# Patient Record
Sex: FEMALE | Race: WHITE | NOT HISPANIC OR LATINO | Employment: OTHER | URBAN - METROPOLITAN AREA
[De-identification: names, ages, dates, MRNs, and addresses within clinical notes are randomized per-mention and may not be internally consistent; named-entity substitution may affect disease eponyms.]

---

## 2017-04-18 ENCOUNTER — TELEPHONE (OUTPATIENT)
Dept: UROLOGY | Facility: CLINIC | Age: 62
End: 2017-04-18

## 2017-04-20 ENCOUNTER — TELEPHONE (OUTPATIENT)
Dept: UROLOGY | Facility: CLINIC | Age: 62
End: 2017-04-20

## 2017-04-24 ENCOUNTER — TELEPHONE (OUTPATIENT)
Dept: RADIOLOGY | Facility: HOSPITAL | Age: 62
End: 2017-04-24

## 2017-04-25 ENCOUNTER — HOSPITAL ENCOUNTER (OUTPATIENT)
Dept: RADIOLOGY | Facility: HOSPITAL | Age: 62
Discharge: HOME | End: 2017-04-25
Attending: UROLOGY
Payer: COMMERCIAL

## 2017-04-25 DIAGNOSIS — N20.0 CALCULUS OF KIDNEY: ICD-10-CM

## 2017-04-25 DIAGNOSIS — N28.1 RENAL CYST: ICD-10-CM

## 2017-04-25 PROCEDURE — 74000 XR ABDOMEN AP 1 VIEW: CPT | Mod: 26,,, | Performed by: RADIOLOGY

## 2017-04-25 PROCEDURE — 74000 XR ABDOMEN AP 1 VIEW: CPT | Mod: TC,PO

## 2017-04-25 PROCEDURE — 25500020 PHARM REV CODE 255: Mod: PO | Performed by: UROLOGY

## 2017-04-25 PROCEDURE — 74178 CT ABD&PLV WO CNTR FLWD CNTR: CPT | Mod: TC,PO

## 2017-04-25 PROCEDURE — 74178 CT ABD&PLV WO CNTR FLWD CNTR: CPT | Mod: 26,,, | Performed by: RADIOLOGY

## 2017-04-25 RX ADMIN — IOHEXOL 30 ML: 350 INJECTION, SOLUTION INTRAVENOUS at 11:04

## 2017-04-25 RX ADMIN — IOHEXOL 100 ML: 350 INJECTION, SOLUTION INTRAVENOUS at 12:04

## 2017-05-03 ENCOUNTER — OFFICE VISIT (OUTPATIENT)
Dept: UROLOGY | Facility: CLINIC | Age: 62
End: 2017-05-03
Payer: COMMERCIAL

## 2017-05-03 VITALS
WEIGHT: 141.31 LBS | DIASTOLIC BLOOD PRESSURE: 88 MMHG | SYSTOLIC BLOOD PRESSURE: 150 MMHG | BODY MASS INDEX: 23.52 KG/M2 | HEART RATE: 80 BPM

## 2017-05-03 DIAGNOSIS — N28.1 RENAL CYST: Primary | ICD-10-CM

## 2017-05-03 DIAGNOSIS — N20.0 RENAL STONE: ICD-10-CM

## 2017-05-03 LAB
BILIRUB SERPL-MCNC: NORMAL MG/DL
BLOOD URINE, POC: NORMAL
COLOR, POC UA: NORMAL
GLUCOSE UR QL STRIP: NORMAL
KETONES UR QL STRIP: NORMAL
LEUKOCYTE ESTERASE URINE, POC: NORMAL
NITRITE, POC UA: NORMAL
PH, POC UA: 8
PROTEIN, POC: NORMAL
SPECIFIC GRAVITY, POC UA: 1
UROBILINOGEN, POC UA: NORMAL

## 2017-05-03 PROCEDURE — 99214 OFFICE O/P EST MOD 30 MIN: CPT | Mod: 25,S$GLB,, | Performed by: UROLOGY

## 2017-05-03 PROCEDURE — 99999 PR PBB SHADOW E&M-EST. PATIENT-LVL II: CPT | Mod: PBBFAC,,, | Performed by: UROLOGY

## 2017-05-03 PROCEDURE — 1160F RVW MEDS BY RX/DR IN RCRD: CPT | Mod: S$GLB,,, | Performed by: UROLOGY

## 2017-05-03 PROCEDURE — 81002 URINALYSIS NONAUTO W/O SCOPE: CPT | Mod: S$GLB,,, | Performed by: UROLOGY

## 2017-05-03 NOTE — MR AVS SNAPSHOT
[facility]  [address] [facility] [place] [address]  Phone: [phone]  Fax: [phone]                  Jemma Pastrana   5/3/2017 2:00 PM   Office Visit    Description:  Female : 1955   Provider:  Murray Mao IV, MD   Department:  [facility]           Diagnoses this Visit        Comments    Renal cyst    -  Primary     Renal stone                To Do List           Future Appointments        Provider Department Dept Phone    5/3/2018 8:00 AM [facility] US3 [facility] [phone]    2018 9:40 AM Murray Mao IV, MD [facility] [phone]      Goals (5 Years of Data)     None      Follow-Up and Disposition     Return in about 1 year (around 5/3/2018).    Follow-up and Disposition History      [facility]     [facility] -  Assistance  Unless otherwise directed by your provider, please contact [facility], our nurse care line that is available for  assistance.     Registered nurses in the [facility] provide: appointment scheduling, clinical advisement, health education, and other advisory services.  Call: [phone] (toll free)               Medications           Message regarding Medications     Verify the changes and/or additions to your medication regime listed below are the same as discussed with your clinician today.  If any of these changes or additions are incorrect, please notify your healthcare provider.        STOP taking these medications     raloxifene (EVISTA) 60 mg tablet Take 1 tablet (60 mg total) by mouth once daily.           Verify that the below list of medications is an accurate representation of the medications you are currently taking.  If none reported, the list may be blank. If incorrect, please contact your healthcare provider. Carry this list with you in case of emergency.           Current Medications     amlodipine (NORVASC) 10 MG tablet Take 10 mg by mouth  once daily.    buPROPion (WELLBUTRIN XL) 300 MG 24 hr tablet Take 300 mg by mouth.    chlorthalidone (HYGROTEN) 25 MG Tab Take 12.5 mg by mouth once daily.    ramipril (ALTACE) 10 MG capsule Take 10 mg by mouth once daily.           Clinical Reference Information           Your Vitals Were     BP Pulse Weight BMI       150/88 (BP Location: Left arm, Patient Position: Sitting, BP Method: Automatic) 80 64.1 kg (141 lb 5 oz) 23.52 kg/m2       Blood Pressure          Most Recent Value    BP  (!)  150/88      Allergies as of 5/3/2017     Procardia [Nifedipine]      Immunizations Administered on Date of Encounter - 5/3/2017     None      Orders Placed During Today's Visit      Normal Orders This Visit    POCT urine dipstick without microscope     Future Labs/Procedures Expected by Expires    US Retroperitoneal Complete (Kidney and  5/3/2017 5/3/2018      [facility] Sign-Up     Activating your [facility] account is as easy as 1-2-3!     1) Visit [email or web address], select Sign Up Now, enter this activation code and your date of birth, then select Next.  Activation code not generated  Current Patient Portal Status: Account disabled      2) Create a username and password to use when you visit [facility] in the future and select a security question in case you lose your password and select Next.    3) Enter your e-mail address and click Sign Up!    Additional Information  If you have questions, please e-mail [email or web address] or call [phone] to talk to our [facility] staff. Remember, [facility] is NOT to be used for urgent needs. For medical emergencies, dial 911.         Language Assistance Services     ATTENTION: Language assistance services are available, free of charge. Please call [phone].      ATENCIÓN: Si habla español, tiene a young disposición servicios gratuitos de asistencia lingüística. Llame al [phone].     CHÚ Ý: N?u b?n nói Ti?ng Vi?t, có các d?ch v? h? tr? ngôn ng? mi?n phí dành cho b?n. G?i s?  [phone].         [facility] complies with applicable Federal civil rights laws and does not discriminate on the basis of race, color, national origin, age, disability, or sex.

## 2017-05-03 NOTE — PROGRESS NOTES
Chief Complaint: Renal Mass    HPI:   5/3/17: Ct shows bilateral small cysts (BosII hyperdense) and some tiny renal stones but no action required.  Discussed in detail.  10/20/16: 62 yo woman referred by Dr. Lopez after discussion about her urologic history.  A year ago she had urolithiasis with trial of passage followed by ESWL and clearance of stone.  One occasion similar before that, all on the left.  About a week ago she had severe left flank pain, went to see Dr. Pereira and a CT RSS was done, and this raised concern for left renal cysts and a US was done.  The US was inconclusive so yesterday she had a CT with contrast.  No abd/pelvic pain and no exac/rel factors.  No hematuria.  No urolithiasis.  No urinary bother.  No other  history.     Allergies:  Procardia [nifedipine]    Medications:  has a current medication list which includes the following prescription(s): amlodipine, bupropion, chlorthalidone, and ramipril.    Review of Systems:  General: No fever, chills, fatigability, or weight loss.  Skin: No rashes, itching, or changes in color or texture of skin.  Chest: Denies SHI, cyanosis, wheezing, cough, and sputum production.  Abdomen: Appetite fine. No weight loss. Denies diarrhea, abdominal pain, hematemesis, or blood in stool.  Musculoskeletal: No joint stiffness or swelling. Denies back pain.  : As above.  All other review of systems negative.    PMH:   has a past medical history of Depression; Hypertension; Osteoporosis; and Urolithiasis.    PSH:   has a past surgical history that includes Hysterectomy; Bladder suspension; Robotic assisted laparoscopic anterior colposacropexy (); Umbilical Hernia Closure;  section, low transverse; and Oophorectomy.    FamHx: family history includes Prostate cancer in her father. There is no history of Allergies.    SocHx:  reports that she quit smoking about 33 years ago. She does not have any smokeless tobacco history on file. She reports that she  drinks alcohol. She reports that she does not use illicit drugs.      Physical Exam:  Vitals:    05/03/17 1359   BP: (!) 150/88   Pulse: 80     General: A&Ox3, no apparent distress, no deformities  Neck: No masses, normal thyroid  Lungs: normal inspiration, no use of accessory muscles  Heart: normal pulse, no arrhythmias  Abdomen: Soft, NT, ND  Skin: The skin is warm and dry. No jaundice.  Ext: No c/c/e.  : deferred    Labs/Studies: Urinalysis performed in clinic, summary: UA normal    Impression/Plan:   1. Reassured about renal cysts; will follow with US in one year, CT the year after that.  2. Discussed stone prevention again

## 2017-06-12 ENCOUNTER — OFFICE VISIT (OUTPATIENT)
Dept: OTOLARYNGOLOGY | Facility: CLINIC | Age: 62
End: 2017-06-12
Payer: COMMERCIAL

## 2017-06-12 VITALS
RESPIRATION RATE: 16 BRPM | TEMPERATURE: 99 F | SYSTOLIC BLOOD PRESSURE: 166 MMHG | WEIGHT: 144.81 LBS | HEIGHT: 65 IN | BODY MASS INDEX: 24.12 KG/M2 | DIASTOLIC BLOOD PRESSURE: 84 MMHG | HEART RATE: 79 BPM

## 2017-06-12 DIAGNOSIS — R05.3 CHRONIC COUGH: ICD-10-CM

## 2017-06-12 DIAGNOSIS — J30.2 SEASONAL ALLERGIC RHINITIS, UNSPECIFIED ALLERGIC RHINITIS TRIGGER: Primary | ICD-10-CM

## 2017-06-12 DIAGNOSIS — I10 ESSENTIAL HYPERTENSION: ICD-10-CM

## 2017-06-12 PROCEDURE — 99999 PR PBB SHADOW E&M-EST. PATIENT-LVL III: CPT | Mod: PBBFAC,,, | Performed by: ORTHOPAEDIC SURGERY

## 2017-06-12 PROCEDURE — 99213 OFFICE O/P EST LOW 20 MIN: CPT | Mod: S$GLB,,, | Performed by: ORTHOPAEDIC SURGERY

## 2017-06-12 NOTE — PROGRESS NOTES
Subjective:       Patient ID: Jemma Pastrana is a 61 y.o. female.    Chief Complaint: Nasal Congestion    Patient is a very pleasant 61 year old female here to see me today for evaluation of a persistent cough.  She says that it has been an issue for the last several months, and has gotten worse.  She has had an increase in her allergy symptoms including sneezing and nasal congestion and drainage.  She denies any occular symptoms.  She now has a dog at home, and has had for two years.  She does find her allergies are worse when she is near the dog.  She has not yet tried any OTC antihistamines and is not currently using any nasal sprays.  She has not had any fevers.      Review of Systems   Constitutional: Negative for chills, fatigue, fever and unexpected weight change.   HENT: Positive for congestion and postnasal drip. Negative for dental problem, ear discharge, ear pain, facial swelling, hearing loss, nosebleeds, rhinorrhea, sinus pressure, sneezing, sore throat, tinnitus, trouble swallowing and voice change.    Eyes: Negative for redness, itching and visual disturbance.   Respiratory: Positive for cough. Negative for choking, shortness of breath and wheezing.    Cardiovascular: Negative for chest pain and palpitations.   Gastrointestinal: Negative for abdominal pain.        No reflux.   Musculoskeletal: Negative for gait problem.   Skin: Negative for rash.   Allergic/Immunologic: Positive for environmental allergies.   Neurological: Negative for dizziness, light-headedness and headaches.       Objective:      Physical Exam   Constitutional: She is oriented to person, place, and time. She appears well-developed and well-nourished. No distress.   HENT:   Head: Normocephalic and atraumatic.   Right Ear: Tympanic membrane, external ear and ear canal normal.   Left Ear: Tympanic membrane, external ear and ear canal normal.   Nose: Nose normal. No mucosal edema, rhinorrhea, nasal deformity or septal deviation. No  epistaxis. Right sinus exhibits no maxillary sinus tenderness and no frontal sinus tenderness. Left sinus exhibits no maxillary sinus tenderness and no frontal sinus tenderness.   Mouth/Throat: Uvula is midline, oropharynx is clear and moist and mucous membranes are normal. Mucous membranes are not pale and not dry. No dental caries. No oropharyngeal exudate or posterior oropharyngeal erythema.   Eyes: Conjunctivae, EOM and lids are normal. Pupils are equal, round, and reactive to light. Right eye exhibits no chemosis. Left eye exhibits no chemosis. Right conjunctiva is not injected. Left conjunctiva is not injected. No scleral icterus. Right eye exhibits normal extraocular motion and no nystagmus. Left eye exhibits normal extraocular motion and no nystagmus.   Neck: Trachea normal and phonation normal. No tracheal tenderness present. No tracheal deviation present. No thyroid mass and no thyromegaly present.   Cardiovascular: Intact distal pulses.    Pulmonary/Chest: Effort normal. No stridor. No respiratory distress.   Abdominal: She exhibits no distension.   Lymphadenopathy:        Head (right side): No submental, no submandibular, no preauricular, no posterior auricular and no occipital adenopathy present.        Head (left side): No submental, no submandibular, no preauricular, no posterior auricular and no occipital adenopathy present.     She has no cervical adenopathy.   Neurological: She is alert and oriented to person, place, and time. No cranial nerve deficit. She displays a negative Romberg sign. Gait normal.   Skin: Skin is warm and dry. No rash noted. No erythema.   Psychiatric: She has a normal mood and affect. Her behavior is normal.       Assessment:       1. Seasonal allergic rhinitis, unspecified allergic rhinitis trigger    2. Chronic cough    3. Essential hypertension        Plan:       1.  AR:   The patient was given a prescription for a steroid nasal spray, and we discussed in detail the proper  mechanism of use directing the spray away from the nasal septum.  In addition, we also discussed that it will take two to three weeks of daily use to achieve maximal effectiveness.  I would recommend Flonase sensimist due to her septal deviation.  Also, add an oral antihistamine daily for the next 4-6 weeks.  If she continues to have symptoms at that point, could consider a trial of Singulair.  2.  Cough:  Patient presents today for initial evaluation with me of chronic cough.  We discussed that cough is very complex symptom as it may arise from numerous sources and frequently represents a combination of contributing factors resulting in chronic cough.  We discussed that chronic postnasal drip or nasal drainage whether infectious or allergic in nature may result in chronic irritation of the larynx leading to irritation and cough.  We also discussed that primary pulmonary pathology such as COPD or asthma may also result in chronic cough.  Additionally, laryngopharyngeal reflux may be present in patients who have a paucity of traditional GERD symptoms as only 3-4 reflux events per day lead to laryngeal irritation, whereas it requires 30-40 reflux events per day before patients began having dyspepsia and heartburn.  Primary laryngeal pathology such as neoplasms, polyps, nodules and granulomas may lead to dysphonia and chronic cough.  When the difficulties in this area is that the presence of cough, due to other etiologies, may result in the formation of polyps nodules and granulomas.   In the absence of evidence of primary pulmonary pathology, significant allergy symptoms, post nasal drip or primary laryngeal pathology ( eg. polyps, nodules, malignancy), sensory neuropathic cough is the most likely diagnosis.  She is on an ACE-I, which can have cough as a sie effect.  3.  Hypertension:  She is on an ACE-I, if her cough persists will have a trial of being off of the ACE-I.

## 2017-09-05 ENCOUNTER — TELEPHONE (OUTPATIENT)
Dept: OTOLARYNGOLOGY | Facility: CLINIC | Age: 62
End: 2017-09-05

## 2017-09-05 DIAGNOSIS — R22.1 NECK MASS: Primary | ICD-10-CM

## 2018-01-19 ENCOUNTER — TELEPHONE (OUTPATIENT)
Dept: OTOLARYNGOLOGY | Facility: HOSPITAL | Age: 63
End: 2018-01-19

## 2018-01-19 ENCOUNTER — HOSPITAL ENCOUNTER (OUTPATIENT)
Dept: RADIOLOGY | Facility: HOSPITAL | Age: 63
Discharge: HOME | End: 2018-01-19
Attending: ORTHOPAEDIC SURGERY
Payer: COMMERCIAL

## 2018-01-19 DIAGNOSIS — S09.92XA INJURY OF NOSE, INITIAL ENCOUNTER: ICD-10-CM

## 2018-01-19 DIAGNOSIS — S09.92XA INJURY OF NOSE, INITIAL ENCOUNTER: Primary | ICD-10-CM

## 2018-01-19 PROCEDURE — 70150 X-RAY EXAM OF FACIAL BONES: CPT | Mod: 26,,, | Performed by: RADIOLOGY

## 2018-01-19 PROCEDURE — 70150 X-RAY EXAM OF FACIAL BONES: CPT | Mod: TC,FY,PO

## 2018-01-19 NOTE — TELEPHONE ENCOUNTER
Attempted to reach patient to schedule an X ray Dr. Lopez ordered and wanted patient to do today but patient didn't answer, left a message with a call back number.

## 2018-01-19 NOTE — TELEPHONE ENCOUNTER
Scheduled Xray today at [facility] location at 5:00 pm per patient request.

## 2018-04-10 ENCOUNTER — OFFICE VISIT (OUTPATIENT)
Dept: OTOLARYNGOLOGY | Facility: CLINIC | Age: 63
End: 2018-04-10
Payer: COMMERCIAL

## 2018-04-10 DIAGNOSIS — H93.11 RIGHT-SIDED TINNITUS: Primary | ICD-10-CM

## 2018-04-10 DIAGNOSIS — J30.89 CHRONIC NON-SEASONAL ALLERGIC RHINITIS, UNSPECIFIED TRIGGER: ICD-10-CM

## 2018-04-10 PROCEDURE — 99213 OFFICE O/P EST LOW 20 MIN: CPT | Mod: S$GLB,,, | Performed by: ORTHOPAEDIC SURGERY

## 2018-04-10 NOTE — PROGRESS NOTES
Subjective:       Patient ID: Jemma Pastrana is a 62 y.o. female.    Chief Complaint: No chief complaint on file.    Patient is a very pleasant 62 y.o. female here to see me today for evaluation of tinnitus.  She reports tinnitus that first started 1 week ago.  She describes the tinnitus as hissing and is nonpulsatile.  The tinnitus is present in the right ear.  She has not noted any diminished hearing and discharge.  She had pain in the right ear only when she laid down last night, now resolved.  She has not recently started any new medications and has not had any dietary changes.  She has had increased allergy symptoms recently, has not started on any antihistamines or nasal sprays at this point.        Review of Systems   Constitutional: Negative for chills, fatigue, fever and unexpected weight change.   HENT: Positive for ear pain and tinnitus. Negative for congestion, dental problem, ear discharge, facial swelling, hearing loss, nosebleeds, postnasal drip, rhinorrhea, sinus pressure, sneezing, sore throat, trouble swallowing and voice change.    Eyes: Negative for redness, itching and visual disturbance.   Respiratory: Negative for cough, choking, shortness of breath and wheezing.    Cardiovascular: Negative for chest pain and palpitations.   Gastrointestinal: Negative for abdominal pain.        No reflux.   Musculoskeletal: Negative for gait problem.   Skin: Negative for rash.   Allergic/Immunologic: Positive for environmental allergies.   Neurological: Negative for dizziness, light-headedness and headaches.       Objective:      Physical Exam   Constitutional: She is oriented to person, place, and time. She appears well-developed and well-nourished. No distress.   HENT:   Head: Normocephalic and atraumatic.   Right Ear: Tympanic membrane, external ear and ear canal normal.   Left Ear: Tympanic membrane, external ear and ear canal normal.   Nose: Mucosal edema and rhinorrhea present. No nasal deformity or  septal deviation. No epistaxis. Right sinus exhibits no maxillary sinus tenderness and no frontal sinus tenderness. Left sinus exhibits no maxillary sinus tenderness and no frontal sinus tenderness.   Mouth/Throat: Uvula is midline, oropharynx is clear and moist and mucous membranes are normal. Mucous membranes are not pale and not dry. No dental caries. No oropharyngeal exudate or posterior oropharyngeal erythema.   Eyes: Conjunctivae, EOM and lids are normal. Pupils are equal, round, and reactive to light. Right eye exhibits no chemosis. Left eye exhibits no chemosis. Right conjunctiva is not injected. Left conjunctiva is not injected. No scleral icterus. Right eye exhibits normal extraocular motion and no nystagmus. Left eye exhibits normal extraocular motion and no nystagmus.   Neck: Trachea normal and phonation normal. No tracheal tenderness present. No tracheal deviation present. No thyroid mass and no thyromegaly present.   Cardiovascular: Intact distal pulses.    Pulmonary/Chest: Effort normal. No stridor. No respiratory distress.   Abdominal: She exhibits no distension.   Lymphadenopathy:        Head (right side): No submental, no submandibular, no preauricular, no posterior auricular and no occipital adenopathy present.        Head (left side): No submental, no submandibular, no preauricular, no posterior auricular and no occipital adenopathy present.     She has no cervical adenopathy.   Neurological: She is alert and oriented to person, place, and time. No cranial nerve deficit.   Skin: Skin is warm and dry. No rash noted. No erythema.   Psychiatric: She has a normal mood and affect. Her behavior is normal.       Assessment:       1. Right-sided tinnitus    2. Chronic non-seasonal allergic rhinitis, unspecified trigger        Plan:       1.  Tinnitus:  Discussed that most tinnitus is related to underlying hearing loss.  Will schedule an audiogram for tomorrow, and will review results when complete.    2.   AR:  She has noted increased allergy symptoms recently, which could also exacerbate her tinnitus.  I would recommend she start on intranasal Flonase daily, use sensimist if needed.  Will make additional plans once audiogram is complete.

## 2018-04-11 ENCOUNTER — CLINICAL SUPPORT (OUTPATIENT)
Dept: AUDIOLOGY | Facility: CLINIC | Age: 63
End: 2018-04-11
Payer: COMMERCIAL

## 2018-04-11 DIAGNOSIS — H93.12 TINNITUS OF LEFT EAR: Primary | ICD-10-CM

## 2018-04-11 PROCEDURE — 92567 TYMPANOMETRY: CPT | Mod: S$GLB,,, | Performed by: AUDIOLOGIST

## 2018-04-11 PROCEDURE — 92557 COMPREHENSIVE HEARING TEST: CPT | Mod: S$GLB,,, | Performed by: AUDIOLOGIST

## 2018-04-11 NOTE — PROGRESS NOTES
Jemma Pastrana was seen 04/11/2018 for an audiological evaluation.  Patient complains of tinnitus in her left ear for the past week.  She denies hearing loss and dizziness.      Results reveal a mild sensorineural hearing loss at 8000 Hz for the right ear, and  mild sensorineural hearing loss at 8000 Hz for the left ear.   Speech Reception Thresholds were  5 dBHL for the right ear and 10 dBHL for the left ear.   Word recognition scores were excellent for the right ear and excellent for the left ear.   Tympanograms were Type A for the right ear and Type A for the left ear.    Patient was counseled on the above findings.    REC:  ENT review of audiogram

## 2018-05-01 ENCOUNTER — TELEPHONE (OUTPATIENT)
Dept: RADIOLOGY | Facility: HOSPITAL | Age: 63
End: 2018-05-01

## 2018-05-02 ENCOUNTER — TELEPHONE (OUTPATIENT)
Dept: UROLOGY | Facility: CLINIC | Age: 63
End: 2018-05-02

## 2018-05-02 DIAGNOSIS — N28.1 RENAL CYST: Primary | ICD-10-CM

## 2018-05-21 ENCOUNTER — HOSPITAL ENCOUNTER (OUTPATIENT)
Dept: RADIOLOGY | Facility: HOSPITAL | Age: 63
Discharge: HOME | End: 2018-05-21
Attending: UROLOGY
Payer: COMMERCIAL

## 2018-05-21 DIAGNOSIS — N28.1 RENAL CYST: ICD-10-CM

## 2018-05-21 PROCEDURE — 76770 US EXAM ABDO BACK WALL COMP: CPT | Mod: 26,,, | Performed by: RADIOLOGY

## 2018-05-21 PROCEDURE — 76770 US EXAM ABDO BACK WALL COMP: CPT | Mod: TC,PO

## 2018-08-06 ENCOUNTER — OFFICE VISIT (OUTPATIENT)
Dept: UROLOGY | Facility: CLINIC | Age: 63
End: 2018-08-06
Payer: COMMERCIAL

## 2018-08-06 VITALS
BODY MASS INDEX: 23.91 KG/M2 | DIASTOLIC BLOOD PRESSURE: 80 MMHG | WEIGHT: 143.5 LBS | SYSTOLIC BLOOD PRESSURE: 118 MMHG | HEIGHT: 65 IN

## 2018-08-06 DIAGNOSIS — N28.1 RENAL CYST: ICD-10-CM

## 2018-08-06 DIAGNOSIS — N28.89 RENAL MASS: Primary | ICD-10-CM

## 2018-08-06 DIAGNOSIS — M81.0 OSTEOPOROSIS, UNSPECIFIED OSTEOPOROSIS TYPE, UNSPECIFIED PATHOLOGICAL FRACTURE PRESENCE: ICD-10-CM

## 2018-08-06 PROCEDURE — 99999 PR PBB SHADOW E&M-EST. PATIENT-LVL II: CPT | Mod: PBBFAC,,, | Performed by: UROLOGY

## 2018-08-06 PROCEDURE — 3074F SYST BP LT 130 MM HG: CPT | Mod: CPTII,S$GLB,, | Performed by: UROLOGY

## 2018-08-06 PROCEDURE — 3079F DIAST BP 80-89 MM HG: CPT | Mod: CPTII,S$GLB,, | Performed by: UROLOGY

## 2018-08-06 PROCEDURE — 3008F BODY MASS INDEX DOCD: CPT | Mod: CPTII,S$GLB,, | Performed by: UROLOGY

## 2018-08-06 PROCEDURE — 99214 OFFICE O/P EST MOD 30 MIN: CPT | Mod: S$GLB,,, | Performed by: UROLOGY

## 2018-08-06 RX ORDER — RISEDRONATE SODIUM 150 MG/1
150 TABLET, FILM COATED ORAL
COMMUNITY
Start: 2018-04-23 | End: 2021-05-04

## 2018-08-06 RX ORDER — IRBESARTAN 75 MG/1
150 TABLET ORAL DAILY
COMMUNITY
Start: 2018-06-12 | End: 2024-01-25

## 2018-08-06 NOTE — PROGRESS NOTES
Chief Complaint: Renal Mass    HPI:   18: US reassures cysts unchanged.  No new problems.  Discussed sugar and sweeteners.  5/3/17: Ct shows bilateral small cysts (BosII hyperdense) and some tiny renal stones but no action required.  Discussed in detail.  10/20/16: 60 yo woman referred by Dr. Lopez after discussion about her urologic history.  A year ago she had urolithiasis with trial of passage followed by ESWL and clearance of stone.  One occasion similar before that, all on the left.  About a week ago she had severe left flank pain, went to see Dr. Pereira and a CT RSS was done, and this raised concern for left renal cysts and a US was done.  The US was inconclusive so yesterday she had a CT with contrast.  No abd/pelvic pain and no exac/rel factors.  No hematuria.  No urolithiasis.  No urinary bother.  No other  history.     Allergies:  Procardia [nifedipine]    Medications:  has a current medication list which includes the following prescription(s): amlodipine, bupropion, chlorthalidone, irbesartan, ramipril, and risedronate.    Review of Systems:  General: No fever, chills, fatigability, or weight loss.  Skin: No rashes, itching, or changes in color or texture of skin.  Chest: Denies SHI, cyanosis, wheezing, cough, and sputum production.  Abdomen: Appetite fine. No weight loss. Denies diarrhea, abdominal pain, hematemesis, or blood in stool.  Musculoskeletal: No joint stiffness or swelling. Denies back pain.  : As above.   All other review of systems negative.    PMH:   has a past medical history of Depression; Hypertension; Osteoporosis; and Urolithiasis.    PSH:   has a past surgical history that includes Hysterectomy; Bladder suspension; Robotic assisted laparoscopic anterior colposacropexy (); Umbilical Hernia Closure;  section, low transverse; and Oophorectomy.    FamHx: family history includes Prostate cancer in her father.    SocHx:  reports that she quit smoking about 35 years  ago. She does not have any smokeless tobacco history on file. She reports that she drinks alcohol. She reports that she does not use drugs.      Physical Exam:  Vitals:    08/06/18 1442   BP: 118/80     General: A&Ox3, no apparent distress, no deformities  Neck: No masses, normal thyroid  Lungs: normal inspiration, no use of accessory muscles  Heart: normal pulse, no arrhythmias  Abdomen: Soft, NT, ND  Skin: The skin is warm and dry. No jaundice.  Ext: No c/c/e.  : deferred    Labs/Studies: Urinalysis performed in clinic, summary: UA normal    Impression/Plan:   1. CT renal mass protocol to check on cysts/stones next year.  2. Discussed stone prevention again  3. Discussed sugar in drinks  4. Discussed actonel

## 2018-09-24 ENCOUNTER — TELEPHONE (OUTPATIENT)
Dept: UROLOGY | Facility: CLINIC | Age: 63
End: 2018-09-24

## 2018-09-24 NOTE — TELEPHONE ENCOUNTER
----- Message from Elisabeth Vazquez sent at 9/24/2018  8:15 AM CDT -----  Contact: pt   States she's calling to be worked in to see Dr for poss kidney stone and can be reached at [phone]//thanks/dbw

## 2018-09-25 ENCOUNTER — TELEPHONE (OUTPATIENT)
Dept: UROLOGY | Facility: CLINIC | Age: 63
End: 2018-09-25

## 2018-09-25 DIAGNOSIS — R10.9 FLANK PAIN: Primary | ICD-10-CM

## 2018-09-25 NOTE — TELEPHONE ENCOUNTER
----- Message from Binta Heredia sent at 9/25/2018  8:06 AM CDT -----  Contact: pt  Pt stated she's returning a call, she can be reached at [phone] Thanks

## 2018-09-25 NOTE — TELEPHONE ENCOUNTER
Patient experiencing flank pain. She has a history of kidney stones and is scheduled to follow up with Dr. Mao August 2019. She is requesting a KUB or US to determine if a stone is present. Please let me know which study I can order for the patient. Thanks!

## 2018-10-02 NOTE — TELEPHONE ENCOUNTER
Notified patient that Dr. Mao ordered KUB and US per her request. States she will call back to schedule them.

## 2018-10-02 NOTE — TELEPHONE ENCOUNTER
"Advised patient of Dr. Mao's recommendation. She states she is not having significant pain. The pain has actually stopped. Patient requesting xray "just to see if something is there". Please advise.   "

## 2019-08-02 ENCOUNTER — TELEPHONE (OUTPATIENT)
Dept: UROLOGY | Facility: CLINIC | Age: 64
End: 2019-08-02

## 2019-08-02 DIAGNOSIS — N28.89 RENAL MASS: Primary | ICD-10-CM

## 2019-08-02 NOTE — TELEPHONE ENCOUNTER
----- Message from RT Reid sent at 8/2/2019  2:24 PM CDT -----  Regarding: pt needs STAT creatinine  Ms. Pastrana has a creatinine serum ordered but the order is Routine. Routine labs are not received the same day. She needs a STAT creatinine ordered before her CT scan Tuesday at 9:50am. Please order STAT creatinine at least 1 1/2 hours before CT appointment or if patient wants to come in the weekend, or Monday.  Please make sure they are STAT.     Please contact patient to make them aware of the changes. We can not do patient until lab results are received.     Patients 60 years and older must have labs within a 30 day window.         Thanks Nuria Morales   Please call with any question [phone]

## 2019-08-05 ENCOUNTER — OFFICE VISIT (OUTPATIENT)
Dept: OTOLARYNGOLOGY | Facility: CLINIC | Age: 64
End: 2019-08-05
Payer: COMMERCIAL

## 2019-08-05 ENCOUNTER — TELEPHONE (OUTPATIENT)
Dept: RADIOLOGY | Facility: HOSPITAL | Age: 64
End: 2019-08-05

## 2019-08-05 VITALS
HEART RATE: 74 BPM | SYSTOLIC BLOOD PRESSURE: 146 MMHG | DIASTOLIC BLOOD PRESSURE: 78 MMHG | BODY MASS INDEX: 24.18 KG/M2 | WEIGHT: 145.31 LBS | TEMPERATURE: 98 F

## 2019-08-05 DIAGNOSIS — R44.8 FACIAL PRESSURE: Primary | ICD-10-CM

## 2019-08-05 DIAGNOSIS — G44.89 OTHER HEADACHE SYNDROME: ICD-10-CM

## 2019-08-05 PROCEDURE — 99213 PR OFFICE/OUTPT VISIT, EST, LEVL III, 20-29 MIN: ICD-10-PCS | Mod: S$GLB,,, | Performed by: PHYSICIAN ASSISTANT

## 2019-08-05 PROCEDURE — 3008F BODY MASS INDEX DOCD: CPT | Mod: CPTII,S$GLB,, | Performed by: PHYSICIAN ASSISTANT

## 2019-08-05 PROCEDURE — 3077F SYST BP >= 140 MM HG: CPT | Mod: CPTII,S$GLB,, | Performed by: PHYSICIAN ASSISTANT

## 2019-08-05 PROCEDURE — 99999 PR PBB SHADOW E&M-EST. PATIENT-LVL II: CPT | Mod: PBBFAC,,, | Performed by: PHYSICIAN ASSISTANT

## 2019-08-05 PROCEDURE — 99999 PR PBB SHADOW E&M-EST. PATIENT-LVL II: ICD-10-PCS | Mod: PBBFAC,,, | Performed by: PHYSICIAN ASSISTANT

## 2019-08-05 PROCEDURE — 3077F PR MOST RECENT SYSTOLIC BLOOD PRESSURE >= 140 MM HG: ICD-10-PCS | Mod: CPTII,S$GLB,, | Performed by: PHYSICIAN ASSISTANT

## 2019-08-05 PROCEDURE — 3008F PR BODY MASS INDEX (BMI) DOCUMENTED: ICD-10-PCS | Mod: CPTII,S$GLB,, | Performed by: PHYSICIAN ASSISTANT

## 2019-08-05 PROCEDURE — 99213 OFFICE O/P EST LOW 20 MIN: CPT | Mod: S$GLB,,, | Performed by: PHYSICIAN ASSISTANT

## 2019-08-05 PROCEDURE — 3078F DIAST BP <80 MM HG: CPT | Mod: CPTII,S$GLB,, | Performed by: PHYSICIAN ASSISTANT

## 2019-08-05 PROCEDURE — 3078F PR MOST RECENT DIASTOLIC BLOOD PRESSURE < 80 MM HG: ICD-10-PCS | Mod: CPTII,S$GLB,, | Performed by: PHYSICIAN ASSISTANT

## 2019-08-05 RX ORDER — AMOXICILLIN AND CLAVULANATE POTASSIUM 875; 125 MG/1; MG/1
1 TABLET, FILM COATED ORAL 2 TIMES DAILY
Qty: 20 TABLET | Refills: 0 | Status: SHIPPED | OUTPATIENT
Start: 2019-08-05 | End: 2019-08-15

## 2019-08-05 NOTE — PROGRESS NOTES
Subjective:       Patient ID: Jemma Pastrana is a 63 y.o. female.    Chief Complaint: Sinus Problem (having headaches; yellow nasal drainage)  Ms. Pastrana is a very pleasant 62 yo female here to see me today with facial pressure, HA. She has had in nasal discharge off/on for a couple months. She has a h/o glaucoma and had her meds changed last week. She thinks her HA may be due to med change. She went to ER ( [facility])  on Saturday and had a CT scan- unable to see images.       MAJOR SYMPTOMS:  Yes  Purulent anterior nasal discharge  No  Purulent or discolored posterior nasal discharge  Yes  Nasal congestion or obstruction  Yes  Facial Congestion or fullness  No  Hyposmia or anosmia  No  Fever    MINOR SYMPTOMS:  Yes  Headache  No  Ear pain, pressure, or fullness  No  Halitosis  No  Dental pain  Yes  Fatigue    The diagnosis of acute sinusitis is based on the presence of at least 2 major or 1 major and at least 2 minor symptoms.  Jemma does meet this criteria.  Clinical Practice Guideline for Acute Bacterial Rhinosinusitis in Children and Adults. Clin Infect Dis 2012; 54:e72    Onset:  2 month ago  Exacerbating factors: No  Relieving factors: No  Timing:  initially improving now worsening        Review of Systems   Constitutional: Positive for fatigue. Negative for chills, fever and unexpected weight change.   HENT: Positive for congestion, rhinorrhea, sinus pressure and sinus pain. Negative for dental problem, ear discharge, ear pain, facial swelling, hearing loss, nosebleeds, postnasal drip, sneezing, sore throat, tinnitus, trouble swallowing and voice change.    Eyes: Negative for redness, itching and visual disturbance.   Respiratory: Negative for cough, choking, shortness of breath and wheezing.    Cardiovascular: Negative for chest pain and palpitations.   Gastrointestinal: Negative for abdominal pain.        No reflux.   Musculoskeletal: Negative for gait problem.   Skin: Negative for rash.   Neurological:  Positive for headaches. Negative for dizziness and light-headedness.       Objective:      Physical Exam   Constitutional: She is oriented to person, place, and time. She appears well-developed and well-nourished. No distress.   HENT:   Head: Normocephalic and atraumatic.   Right Ear: Tympanic membrane, external ear and ear canal normal.   Left Ear: Tympanic membrane, external ear and ear canal normal.   Nose: No mucosal edema, rhinorrhea, nasal deformity or septal deviation. No epistaxis. Right sinus exhibits frontal sinus tenderness. Right sinus exhibits no maxillary sinus tenderness. Left sinus exhibits frontal sinus tenderness. Left sinus exhibits no maxillary sinus tenderness.   Mouth/Throat: Uvula is midline, oropharynx is clear and moist and mucous membranes are normal. Mucous membranes are not pale and not dry. No dental caries. No oropharyngeal exudate or posterior oropharyngeal erythema.   Eyes: Pupils are equal, round, and reactive to light. Conjunctivae, EOM and lids are normal. Right eye exhibits no chemosis. Left eye exhibits no chemosis. Right conjunctiva is not injected. Left conjunctiva is not injected. No scleral icterus. Right eye exhibits normal extraocular motion and no nystagmus. Left eye exhibits normal extraocular motion and no nystagmus.   Neck: Trachea normal and phonation normal. No tracheal tenderness present. No tracheal deviation present. No thyroid mass and no thyromegaly present.   Cardiovascular: Intact distal pulses.   Pulmonary/Chest: Effort normal. No stridor. No respiratory distress.   Abdominal: She exhibits no distension.   Lymphadenopathy:        Head (right side): No submental, no submandibular, no preauricular, no posterior auricular and no occipital adenopathy present.        Head (left side): No submental, no submandibular, no preauricular, no posterior auricular and no occipital adenopathy present.     She has no cervical adenopathy.   Neurological: She is alert and  oriented to person, place, and time. No cranial nerve deficit.   Skin: Skin is warm and dry. No rash noted. No erythema.   Psychiatric: She has a normal mood and affect. Her behavior is normal.       Assessment:       1. Facial pressure    2. Other headache syndrome        Plan:       Sinusitis: will have her sign medical release form and obtain CT scan from [facility]. In the meantime I will treat her with 10 day course of antibiotics. She needs to continue to follow with her opthalmologist regarding her eye pressure and medication.

## 2019-08-06 ENCOUNTER — TELEPHONE (OUTPATIENT)
Dept: OTOLARYNGOLOGY | Facility: CLINIC | Age: 64
End: 2019-08-06

## 2019-08-06 ENCOUNTER — HOSPITAL ENCOUNTER (OUTPATIENT)
Dept: RADIOLOGY | Facility: HOSPITAL | Age: 64
Discharge: HOME | End: 2019-08-06
Attending: UROLOGY
Payer: COMMERCIAL

## 2019-08-06 DIAGNOSIS — N28.1 RENAL CYST: ICD-10-CM

## 2019-08-06 DIAGNOSIS — N28.89 RENAL MASS: ICD-10-CM

## 2019-08-06 PROCEDURE — 74178 CT ABD&PLV WO CNTR FLWD CNTR: CPT | Mod: 26,,, | Performed by: RADIOLOGY

## 2019-08-06 PROCEDURE — 25500020 PHARM REV CODE 255: Performed by: UROLOGY

## 2019-08-06 PROCEDURE — 74178 CT ABD&PLV WO CNTR FLWD CNTR: CPT | Mod: TC

## 2019-08-06 PROCEDURE — 74178 CT ABDOMEN PELVIS W WO CONTRAST: ICD-10-PCS | Mod: 26,,, | Performed by: RADIOLOGY

## 2019-08-06 RX ADMIN — IOHEXOL 100 ML: 350 INJECTION, SOLUTION INTRAVENOUS at 09:08

## 2019-08-06 NOTE — TELEPHONE ENCOUNTER
----- Message from Radha Lui sent at 8/6/2019 12:21 PM CDT -----  Contact: self  Type:  Patient Returning Call    Who Called:Jemma Trevizo  Who Left Message for Patient:  Does the patient know what this is regarding?:  Would the patient rather a call back or a response via [facility]? call  Best Call Back Number:[phone]  Additional Information:

## 2019-08-07 ENCOUNTER — TELEPHONE (OUTPATIENT)
Dept: OTOLARYNGOLOGY | Facility: CLINIC | Age: 64
End: 2019-08-07

## 2019-08-07 RX ORDER — AZITHROMYCIN 250 MG/1
TABLET, FILM COATED ORAL
Qty: 6 TABLET | Refills: 0 | Status: SHIPPED | OUTPATIENT
Start: 2019-08-07 | End: 2021-05-04

## 2019-08-07 NOTE — TELEPHONE ENCOUNTER
----- Message from Sandy Alarcon sent at 8/7/2019  9:07 AM CDT -----  Contact: Patient  Patient is okay with the medication change, Please call her at [phone].    Thanks  td

## 2019-08-07 NOTE — TELEPHONE ENCOUNTER
Spoke with patient. She would like her Augmentin changed to a different medication. States medication does not work for her. Message sent to provider for review.

## 2019-08-07 NOTE — TELEPHONE ENCOUNTER
Left detailed message advising pt that new antibiotics have been sent in.  Pt advised to call with any questions.

## 2019-08-07 NOTE — TELEPHONE ENCOUNTER
----- Message from Violette Moon LPN sent at 8/7/2019  9:12 AM CDT -----  Contact: Patient  Patient will like Augmentin changed to different medication. States Augmentin doesn't work for her. Thanks.  ----- Message -----  From: Sandy Alarcon  Sent: 8/7/2019   9:07 AM  To: Colin Guy Staff    Patient is okay with the medication change, Please call her at [phone].    Thanks  td

## 2019-08-12 ENCOUNTER — OFFICE VISIT (OUTPATIENT)
Dept: UROLOGY | Facility: CLINIC | Age: 64
End: 2019-08-12
Payer: COMMERCIAL

## 2019-08-12 VITALS
DIASTOLIC BLOOD PRESSURE: 78 MMHG | HEART RATE: 88 BPM | BODY MASS INDEX: 24.16 KG/M2 | WEIGHT: 145 LBS | SYSTOLIC BLOOD PRESSURE: 120 MMHG | HEIGHT: 65 IN

## 2019-08-12 DIAGNOSIS — N28.1 RENAL CYST: Primary | ICD-10-CM

## 2019-08-12 DIAGNOSIS — N20.0 RENAL STONE: ICD-10-CM

## 2019-08-12 LAB
BILIRUB SERPL-MCNC: NORMAL MG/DL
BLOOD URINE, POC: NORMAL
COLOR, POC UA: YELLOW
GLUCOSE UR QL STRIP: NORMAL
KETONES UR QL STRIP: NORMAL
LEUKOCYTE ESTERASE URINE, POC: NORMAL
NITRITE, POC UA: NORMAL
PH, POC UA: 5
PROTEIN, POC: NORMAL
SPECIFIC GRAVITY, POC UA: 1.02
UROBILINOGEN, POC UA: NORMAL

## 2019-08-12 PROCEDURE — 99999 PR PBB SHADOW E&M-EST. PATIENT-LVL III: ICD-10-PCS | Mod: PBBFAC,,, | Performed by: UROLOGY

## 2019-08-12 PROCEDURE — 81002 POCT URINE DIPSTICK WITHOUT MICROSCOPE: ICD-10-PCS | Mod: S$GLB,,, | Performed by: UROLOGY

## 2019-08-12 PROCEDURE — 99999 PR PBB SHADOW E&M-EST. PATIENT-LVL III: CPT | Mod: PBBFAC,,, | Performed by: UROLOGY

## 2019-08-12 PROCEDURE — 81002 URINALYSIS NONAUTO W/O SCOPE: CPT | Mod: S$GLB,,, | Performed by: UROLOGY

## 2019-08-12 PROCEDURE — 99214 PR OFFICE/OUTPT VISIT, EST, LEVL IV, 30-39 MIN: ICD-10-PCS | Mod: 25,S$GLB,, | Performed by: UROLOGY

## 2019-08-12 PROCEDURE — 3074F PR MOST RECENT SYSTOLIC BLOOD PRESSURE < 130 MM HG: ICD-10-PCS | Mod: CPTII,S$GLB,, | Performed by: UROLOGY

## 2019-08-12 PROCEDURE — 3008F BODY MASS INDEX DOCD: CPT | Mod: CPTII,S$GLB,, | Performed by: UROLOGY

## 2019-08-12 PROCEDURE — 99214 OFFICE O/P EST MOD 30 MIN: CPT | Mod: 25,S$GLB,, | Performed by: UROLOGY

## 2019-08-12 PROCEDURE — 3008F PR BODY MASS INDEX (BMI) DOCUMENTED: ICD-10-PCS | Mod: CPTII,S$GLB,, | Performed by: UROLOGY

## 2019-08-12 PROCEDURE — 3074F SYST BP LT 130 MM HG: CPT | Mod: CPTII,S$GLB,, | Performed by: UROLOGY

## 2019-08-12 PROCEDURE — 3078F DIAST BP <80 MM HG: CPT | Mod: CPTII,S$GLB,, | Performed by: UROLOGY

## 2019-08-12 PROCEDURE — 3078F PR MOST RECENT DIASTOLIC BLOOD PRESSURE < 80 MM HG: ICD-10-PCS | Mod: CPTII,S$GLB,, | Performed by: UROLOGY

## 2019-08-12 NOTE — PROGRESS NOTES
Chief Complaint: Renal Mass    HPI:   19: CT has mildly complex left renal cysts and a small right simple cyst, small right stones.  Reassuring.  18: US reassures cysts unchanged.  No new problems.  Discussed sugar and sweeteners.  5/3/17: Ct shows bilateral small cysts (BosII hyperdense) and some tiny renal stones but no action required.  Discussed in detail.  10/20/16: 60 yo woman referred by Dr. Lopez after discussion about her urologic history.  A year ago she had urolithiasis with trial of passage followed by ESWL and clearance of stone.  One occasion similar before that, all on the left.  About a week ago she had severe left flank pain, went to see Dr. Pereira and a CT RSS was done, and this raised concern for left renal cysts and a US was done.  The US was inconclusive so yesterday she had a CT with contrast.  No abd/pelvic pain and no exac/rel factors.  No hematuria.  No urolithiasis.  No urinary bother.  No other  history.     Allergies:  Procardia [nifedipine]    Medications:  has a current medication list which includes the following prescription(s): amlodipine, bupropion, chlorthalidone, irbesartan, amoxicillin-clavulanate 875-125mg, azithromycin, ramipril, and risedronate.    Review of Systems:  General: No fever, chills, fatigability, or weight loss.  Skin: No rashes, itching, or changes in color or texture of skin.  Chest: Denies SHI, cyanosis, wheezing, cough, and sputum production.  Abdomen: Appetite fine. No weight loss. Denies diarrhea, abdominal pain, hematemesis, or blood in stool.  Musculoskeletal: No joint stiffness or swelling. Denies back pain.  : As above.   All other review of systems negative.    PMH:   has a past medical history of Depression, Hypertension, Osteoporosis, and Urolithiasis.    PSH:   has a past surgical history that includes Hysterectomy; Bladder suspension; Robotic assisted laparoscopic anterior colposacropexy (); Umbilical Hernia Closure;   section, low transverse; and Oophorectomy.    FamHx: family history includes Prostate cancer in her father.    SocHx:  reports that she quit smoking about 36 years ago. She does not have any smokeless tobacco history on file. She reports that she drinks alcohol. She reports that she does not use drugs.      Physical Exam:  Vitals:    08/12/19 1407   BP: 120/78   Pulse: 88     General: A&Ox3, no apparent distress, no deformities  Neck: No masses, normal thyroid  Lungs: normal inspiration, no use of accessory muscles  Heart: normal pulse, no arrhythmias  Abdomen: Soft, NT, ND  Skin: The skin is warm and dry. No jaundice.  Ext: No c/c/e.  : deferred    Labs/Studies: Urinalysis performed in clinic, summary: UA normal    Impression/Plan:   1. KUB/US in a year to follow stones and cyst.

## 2019-09-10 ENCOUNTER — TELEPHONE (OUTPATIENT)
Dept: OBSTETRICS AND GYNECOLOGY | Facility: CLINIC | Age: 64
End: 2019-09-10

## 2019-09-10 DIAGNOSIS — Z12.39 BREAST CANCER SCREENING: Primary | ICD-10-CM

## 2019-09-10 NOTE — TELEPHONE ENCOUNTER
Spoke with patient and scheduled patient for annual exam and mammogram. Patient verbalized understanding of locations and times of appointments.

## 2019-09-10 NOTE — TELEPHONE ENCOUNTER
----- Message from Michelle Vicente sent at 9/10/2019  9:46 AM CDT -----  Contact: patient  Calling to get orders to have a mammogram done. Please call patient @ [phone]. Thanks, taina

## 2019-09-18 ENCOUNTER — TELEPHONE (OUTPATIENT)
Dept: OBSTETRICS AND GYNECOLOGY | Facility: CLINIC | Age: 64
End: 2019-09-18

## 2019-09-18 NOTE — TELEPHONE ENCOUNTER
Contacted pt to reschedule appt due to Dr. Lala's surgery schedule. Pt confirmed 10/2/19 @ 230p was ok to schedule.

## 2019-09-26 ENCOUNTER — HOSPITAL ENCOUNTER (OUTPATIENT)
Dept: RADIOLOGY | Facility: HOSPITAL | Age: 64
Discharge: HOME | End: 2019-09-26
Attending: OBSTETRICS & GYNECOLOGY
Payer: COMMERCIAL

## 2019-09-26 VITALS — HEIGHT: 65 IN | BODY MASS INDEX: 24.17 KG/M2 | WEIGHT: 145.06 LBS

## 2019-09-26 DIAGNOSIS — Z12.39 BREAST CANCER SCREENING: ICD-10-CM

## 2019-09-26 PROCEDURE — 77067 SCR MAMMO BI INCL CAD: CPT | Mod: 26,,, | Performed by: RADIOLOGY

## 2019-09-26 PROCEDURE — 77067 SCR MAMMO BI INCL CAD: CPT | Mod: TC

## 2019-09-26 PROCEDURE — 77063 BREAST TOMOSYNTHESIS BI: CPT | Mod: 26,,, | Performed by: RADIOLOGY

## 2019-09-26 PROCEDURE — 77063 MAMMO DIGITAL SCREENING BILAT WITH TOMOSYNTHESIS_CAD: ICD-10-PCS | Mod: 26,,, | Performed by: RADIOLOGY

## 2019-09-26 PROCEDURE — 77067 MAMMO DIGITAL SCREENING BILAT WITH TOMOSYNTHESIS_CAD: ICD-10-PCS | Mod: 26,,, | Performed by: RADIOLOGY

## 2019-09-30 ENCOUNTER — TELEPHONE (OUTPATIENT)
Dept: OBSTETRICS AND GYNECOLOGY | Facility: CLINIC | Age: 64
End: 2019-09-30

## 2019-09-30 NOTE — TELEPHONE ENCOUNTER
Returned pt call req mammo results. Informed pt that her provider has not reviewed the results just yet but once they've been reviewed we would give her a call. Pt verbalized understanding.

## 2019-09-30 NOTE — TELEPHONE ENCOUNTER
----- Message from Hilario León sent at 9/30/2019 10:46 AM CDT -----  Contact: pt  Pt is calling the staff regarding patient mammogram results.    Pt call back today  [phone]    Thanks

## 2020-07-29 ENCOUNTER — TELEPHONE (OUTPATIENT)
Dept: UROLOGY | Facility: CLINIC | Age: 65
End: 2020-07-29

## 2020-07-29 NOTE — TELEPHONE ENCOUNTER
----- Message from Emma Rubin sent at 7/29/2020  3:09 PM CDT -----  Contact: self/[phone]  Would like to consult with nurse regarding her cancels appts, please call back at [phone]. Thanks/ar

## 2020-11-12 ENCOUNTER — TELEPHONE (OUTPATIENT)
Dept: OTOLARYNGOLOGY | Facility: CLINIC | Age: 65
End: 2020-11-12

## 2020-11-18 ENCOUNTER — OFFICE VISIT (OUTPATIENT)
Dept: OTOLARYNGOLOGY | Facility: CLINIC | Age: 65
End: 2020-11-18
Payer: COMMERCIAL

## 2020-11-18 VITALS
WEIGHT: 150.81 LBS | TEMPERATURE: 97 F | SYSTOLIC BLOOD PRESSURE: 156 MMHG | DIASTOLIC BLOOD PRESSURE: 86 MMHG | HEART RATE: 100 BPM | BODY MASS INDEX: 25.09 KG/M2

## 2020-11-18 DIAGNOSIS — R22.1 NECK MASS: Primary | ICD-10-CM

## 2020-11-18 DIAGNOSIS — R22.1 LOCALIZED SWELLING, MASS AND LUMP, NECK: ICD-10-CM

## 2020-11-18 PROCEDURE — 99999 PR PBB SHADOW E&M-EST. PATIENT-LVL III: ICD-10-PCS | Mod: PBBFAC,,, | Performed by: ORTHOPAEDIC SURGERY

## 2020-11-18 PROCEDURE — 1125F AMNT PAIN NOTED PAIN PRSNT: CPT | Mod: S$GLB,,, | Performed by: ORTHOPAEDIC SURGERY

## 2020-11-18 PROCEDURE — 3077F SYST BP >= 140 MM HG: CPT | Mod: CPTII,S$GLB,, | Performed by: ORTHOPAEDIC SURGERY

## 2020-11-18 PROCEDURE — 99999 PR PBB SHADOW E&M-EST. PATIENT-LVL III: CPT | Mod: PBBFAC,,, | Performed by: ORTHOPAEDIC SURGERY

## 2020-11-18 PROCEDURE — 1125F PR PAIN SEVERITY QUANTIFIED, PAIN PRESENT: ICD-10-PCS | Mod: S$GLB,,, | Performed by: ORTHOPAEDIC SURGERY

## 2020-11-18 PROCEDURE — 3008F BODY MASS INDEX DOCD: CPT | Mod: CPTII,S$GLB,, | Performed by: ORTHOPAEDIC SURGERY

## 2020-11-18 PROCEDURE — 3008F PR BODY MASS INDEX (BMI) DOCUMENTED: ICD-10-PCS | Mod: CPTII,S$GLB,, | Performed by: ORTHOPAEDIC SURGERY

## 2020-11-18 PROCEDURE — 3077F PR MOST RECENT SYSTOLIC BLOOD PRESSURE >= 140 MM HG: ICD-10-PCS | Mod: CPTII,S$GLB,, | Performed by: ORTHOPAEDIC SURGERY

## 2020-11-18 PROCEDURE — 3079F DIAST BP 80-89 MM HG: CPT | Mod: CPTII,S$GLB,, | Performed by: ORTHOPAEDIC SURGERY

## 2020-11-18 PROCEDURE — 3079F PR MOST RECENT DIASTOLIC BLOOD PRESSURE 80-89 MM HG: ICD-10-PCS | Mod: CPTII,S$GLB,, | Performed by: ORTHOPAEDIC SURGERY

## 2020-11-18 PROCEDURE — 99213 PR OFFICE/OUTPT VISIT, EST, LEVL III, 20-29 MIN: ICD-10-PCS | Mod: S$GLB,,, | Performed by: ORTHOPAEDIC SURGERY

## 2020-11-18 PROCEDURE — 99213 OFFICE O/P EST LOW 20 MIN: CPT | Mod: S$GLB,,, | Performed by: ORTHOPAEDIC SURGERY

## 2020-11-18 RX ORDER — DIAZEPAM 5 MG/1
5 TABLET ORAL EVERY 8 HOURS PRN
COMMUNITY
Start: 2020-11-02 | End: 2021-05-04

## 2020-11-18 RX ORDER — TAFLUPROST OPTHALMIC 0 MG/.3ML
1 SOLUTION/ DROPS OPHTHALMIC DAILY
COMMUNITY

## 2020-11-18 NOTE — PROGRESS NOTES
Subjective:      Patient ID: Jemma Pastrana is a 65 y.o. female.    Chief Complaint: Neck mass    Patient is a 65 year old female seen today for evaluation of a persistent left neck mass.  She says that it has been present for the last 3 years, but is somewhat intermittent in nature.  She has pain when it is enlarged.  She does not note that the pain is worse with movement.  She has no dysphagia or otalgia.  She has not had any redness or drainage through the skin.        Review of Systems   Constitutional: Negative for chills, fatigue, fever and unexpected weight change.   HENT: Negative for congestion, dental problem, ear discharge, ear pain, facial swelling, hearing loss, nosebleeds, postnasal drip, rhinorrhea, sinus pressure, sneezing, sore throat, tinnitus, trouble swallowing and voice change.    Eyes: Negative for redness, itching and visual disturbance.   Respiratory: Negative for cough, choking, shortness of breath and wheezing.    Cardiovascular: Negative for chest pain and palpitations.   Gastrointestinal: Negative for abdominal pain.        No reflux.   Musculoskeletal: Negative for gait problem.   Skin: Negative for rash.   Neurological: Negative for dizziness, light-headedness and headaches.       Objective:       Physical Exam  Constitutional:       General: She is not in acute distress.     Appearance: She is well-developed.   HENT:      Head: Normocephalic and atraumatic.      Right Ear: Tympanic membrane, ear canal and external ear normal.      Left Ear: Tympanic membrane, ear canal and external ear normal.      Nose: Nose normal. No nasal deformity, septal deviation, mucosal edema or rhinorrhea.      Right Sinus: No maxillary sinus tenderness or frontal sinus tenderness.      Left Sinus: No maxillary sinus tenderness or frontal sinus tenderness.      Mouth/Throat:      Mouth: Mucous membranes are not pale and not dry.      Dentition: No dental caries.      Pharynx: Uvula midline. No  oropharyngeal exudate or posterior oropharyngeal erythema.   Eyes:      General: Lids are normal. No scleral icterus.     Extraocular Movements:      Right eye: Normal extraocular motion and no nystagmus.      Left eye: Normal extraocular motion and no nystagmus.      Conjunctiva/sclera: Conjunctivae normal.      Right eye: Right conjunctiva is not injected. No chemosis.     Left eye: Left conjunctiva is not injected. No chemosis.     Pupils: Pupils are equal, round, and reactive to light.   Neck:      Thyroid: No thyroid mass or thyromegaly.      Trachea: Trachea and phonation normal. No tracheal tenderness or tracheal deviation.      Comments: Prominent left carotid bulb  Pulmonary:      Effort: Pulmonary effort is normal. No respiratory distress.      Breath sounds: No stridor.   Abdominal:      General: There is no distension.   Lymphadenopathy:      Head:      Right side of head: No submental, submandibular, preauricular, posterior auricular or occipital adenopathy.      Left side of head: No submental, submandibular, preauricular, posterior auricular or occipital adenopathy.      Cervical: No cervical adenopathy.   Skin:     General: Skin is warm and dry.      Findings: No erythema or rash.   Neurological:      Mental Status: She is alert and oriented to person, place, and time.      Cranial Nerves: No cranial nerve deficit.   Psychiatric:         Behavior: Behavior normal.         Assessment:       1. Neck mass    2. Localized swelling, mass and lump, neck        Plan:     Neck mass  -     Creatinine, serum; Future; Expected date: 11/18/2020    Localized swelling, mass and lump, neck  -     Cancel: CT Soft Tissue Neck With Contrast; Future; Expected date: 11/18/2020  -     Creatinine, serum; Future; Expected date: 11/18/2020  -     CT Soft Tissue Neck With Contrast; Future; Expected date: 11/18/2020    Patient has had US done at [facility], report not fully available via [facility], but mention is made of  torturous carotid.  I would recommend CT neck, will call with results.  Could consider PM&R consult if neck pain continues.

## 2020-12-03 ENCOUNTER — TELEPHONE (OUTPATIENT)
Dept: OTOLARYNGOLOGY | Facility: CLINIC | Age: 65
End: 2020-12-03

## 2020-12-03 NOTE — TELEPHONE ENCOUNTER
Per Dr. Lopez, the patient is having CT done at [facility].  I went ahead and update the referral to reflect this.  I called the patient and advised her.  I asked her to call me back on next week so that I can check on the status.

## 2020-12-09 ENCOUNTER — TELEPHONE (OUTPATIENT)
Dept: OTOLARYNGOLOGY | Facility: CLINIC | Age: 65
End: 2020-12-09

## 2020-12-09 NOTE — TELEPHONE ENCOUNTER
----- Message from Dante Dial sent at 12/9/2020  9:55 AM CST -----  Contact: self  Would like to consult with nurse Kovacs regarding an approval for a Ct Scan from [employer]  Please contact Jemma Pastrana @ [phone].  Thanks/As

## 2020-12-09 NOTE — TELEPHONE ENCOUNTER
I left a message letting the patient know that her authorization to have her test done at [facility] has been approved.  This should have been sent to the [facility] by the referral department.  I asked that she please check with the  clinic.  If they do not have it we can fax it over to them.  This is the information I copied from the referral/Authorization.    AUTH#[id number] VALID 12/3/20-1/1/21  @[facility]

## 2020-12-09 NOTE — TELEPHONE ENCOUNTER
----- Message from Norbert Klein sent at 12/9/2020 10:04 AM CST -----  Contact: Jemma Valverde is returning a phone call. Please call her back at [phone].      Thanks  DD

## 2021-04-21 ENCOUNTER — OFFICE VISIT (OUTPATIENT)
Dept: OTOLARYNGOLOGY | Facility: CLINIC | Age: 66
End: 2021-04-21
Payer: COMMERCIAL

## 2021-04-21 ENCOUNTER — TELEPHONE (OUTPATIENT)
Dept: OTOLARYNGOLOGY | Facility: CLINIC | Age: 66
End: 2021-04-21

## 2021-04-21 VITALS
WEIGHT: 153.69 LBS | BODY MASS INDEX: 25.57 KG/M2 | DIASTOLIC BLOOD PRESSURE: 85 MMHG | SYSTOLIC BLOOD PRESSURE: 153 MMHG | HEART RATE: 79 BPM

## 2021-04-21 DIAGNOSIS — R51.9 ACUTE NONINTRACTABLE HEADACHE, UNSPECIFIED HEADACHE TYPE: ICD-10-CM

## 2021-04-21 DIAGNOSIS — J32.4 CHRONIC PANSINUSITIS: Primary | ICD-10-CM

## 2021-04-21 DIAGNOSIS — D70.9 NEUTROPENIA, UNSPECIFIED TYPE: ICD-10-CM

## 2021-04-21 PROCEDURE — 99999 PR PBB SHADOW E&M-EST. PATIENT-LVL III: ICD-10-PCS | Mod: PBBFAC,,, | Performed by: ORTHOPAEDIC SURGERY

## 2021-04-21 PROCEDURE — 99999 PR PBB SHADOW E&M-EST. PATIENT-LVL III: CPT | Mod: PBBFAC,,, | Performed by: ORTHOPAEDIC SURGERY

## 2021-04-21 PROCEDURE — 99214 PR OFFICE/OUTPT VISIT, EST, LEVL IV, 30-39 MIN: ICD-10-PCS | Mod: S$GLB,,, | Performed by: ORTHOPAEDIC SURGERY

## 2021-04-21 PROCEDURE — 99214 OFFICE O/P EST MOD 30 MIN: CPT | Mod: S$GLB,,, | Performed by: ORTHOPAEDIC SURGERY

## 2021-04-21 PROCEDURE — 3008F BODY MASS INDEX DOCD: CPT | Mod: CPTII,S$GLB,, | Performed by: ORTHOPAEDIC SURGERY

## 2021-04-21 PROCEDURE — 3008F PR BODY MASS INDEX (BMI) DOCUMENTED: ICD-10-PCS | Mod: CPTII,S$GLB,, | Performed by: ORTHOPAEDIC SURGERY

## 2021-04-21 RX ORDER — MUPIROCIN 20 MG/G
OINTMENT TOPICAL 2 TIMES DAILY
Qty: 15 G | Refills: 3 | Status: SHIPPED | OUTPATIENT
Start: 2021-04-21 | End: 2021-05-01

## 2021-04-21 RX ORDER — METHYLPREDNISOLONE 4 MG/1
TABLET ORAL
Qty: 1 PACKAGE | Refills: 0 | Status: SHIPPED | OUTPATIENT
Start: 2021-04-21 | End: 2021-05-04

## 2021-04-26 ENCOUNTER — HOSPITAL ENCOUNTER (OUTPATIENT)
Dept: RADIOLOGY | Facility: HOSPITAL | Age: 66
Discharge: HOME | End: 2021-04-26
Attending: ORTHOPAEDIC SURGERY
Payer: COMMERCIAL

## 2021-04-26 ENCOUNTER — TELEPHONE (OUTPATIENT)
Dept: OTOLARYNGOLOGY | Facility: CLINIC | Age: 66
End: 2021-04-26

## 2021-04-26 DIAGNOSIS — J32.4 CHRONIC PANSINUSITIS: ICD-10-CM

## 2021-04-26 PROCEDURE — 70486 CT MAXILLOFACIAL W/O DYE: CPT | Mod: TC

## 2021-04-27 RX ORDER — FLUTICASONE PROPIONATE 50 MCG
2 SPRAY, SUSPENSION (ML) NASAL DAILY
Qty: 16 G | Refills: 12 | Status: SHIPPED | OUTPATIENT
Start: 2021-04-27 | End: 2023-11-01

## 2021-04-28 ENCOUNTER — PATIENT MESSAGE (OUTPATIENT)
Dept: RESEARCH | Facility: HOSPITAL | Age: 66
End: 2021-04-28

## 2021-05-05 ENCOUNTER — TELEPHONE (OUTPATIENT)
Dept: UROLOGY | Facility: CLINIC | Age: 66
End: 2021-05-05

## 2021-05-05 DIAGNOSIS — N28.1 RENAL CYST: Primary | ICD-10-CM

## 2021-05-06 ENCOUNTER — LAB VISIT (OUTPATIENT)
Dept: LAB | Facility: HOSPITAL | Age: 66
End: 2021-05-06
Attending: SPECIALIST
Payer: COMMERCIAL

## 2021-05-06 ENCOUNTER — OFFICE VISIT (OUTPATIENT)
Dept: ALLERGY | Facility: CLINIC | Age: 66
End: 2021-05-06
Payer: COMMERCIAL

## 2021-05-06 ENCOUNTER — PATIENT MESSAGE (OUTPATIENT)
Dept: ALLERGY | Facility: CLINIC | Age: 66
End: 2021-05-06

## 2021-05-06 ENCOUNTER — TELEPHONE (OUTPATIENT)
Dept: RADIOLOGY | Facility: HOSPITAL | Age: 66
End: 2021-05-06

## 2021-05-06 ENCOUNTER — TELEPHONE (OUTPATIENT)
Dept: ALLERGY | Facility: CLINIC | Age: 66
End: 2021-05-06

## 2021-05-06 VITALS
HEIGHT: 65 IN | TEMPERATURE: 98 F | HEART RATE: 86 BPM | SYSTOLIC BLOOD PRESSURE: 155 MMHG | WEIGHT: 150.56 LBS | DIASTOLIC BLOOD PRESSURE: 89 MMHG | BODY MASS INDEX: 25.08 KG/M2

## 2021-05-06 DIAGNOSIS — R53.81 MALAISE: ICD-10-CM

## 2021-05-06 DIAGNOSIS — J32.9 RECURRENT SINUSITIS: Primary | ICD-10-CM

## 2021-05-06 DIAGNOSIS — R11.0 NAUSEA: ICD-10-CM

## 2021-05-06 DIAGNOSIS — R51.9 NONINTRACTABLE EPISODIC HEADACHE, UNSPECIFIED HEADACHE TYPE: ICD-10-CM

## 2021-05-06 DIAGNOSIS — R42 DIZZINESS OF UNKNOWN ETIOLOGY: ICD-10-CM

## 2021-05-06 DIAGNOSIS — J32.9 RECURRENT SINUSITIS: ICD-10-CM

## 2021-05-06 DIAGNOSIS — R09.81 NASAL CONGESTION: ICD-10-CM

## 2021-05-06 LAB
BASOPHILS # BLD AUTO: 0.06 K/UL (ref 0–0.2)
BASOPHILS NFR BLD: 1.3 % (ref 0–1.9)
DIFFERENTIAL METHOD: ABNORMAL
EOSINOPHIL # BLD AUTO: 0.2 K/UL (ref 0–0.5)
EOSINOPHIL NFR BLD: 4.1 % (ref 0–8)
ERYTHROCYTE [DISTWIDTH] IN BLOOD BY AUTOMATED COUNT: 13.1 % (ref 11.5–14.5)
HCT VFR BLD AUTO: 43.3 % (ref 37–48.5)
HGB BLD-MCNC: 14.4 G/DL (ref 12–16)
IGA SERPL-MCNC: 182 MG/DL (ref 40–350)
IGG SERPL-MCNC: 1099 MG/DL (ref 650–1600)
IGM SERPL-MCNC: 67 MG/DL (ref 50–300)
IMM GRANULOCYTES # BLD AUTO: 0.01 K/UL (ref 0–0.04)
IMM GRANULOCYTES NFR BLD AUTO: 0.2 % (ref 0–0.5)
LYMPHOCYTES # BLD AUTO: 1.8 K/UL (ref 1–4.8)
LYMPHOCYTES NFR BLD: 38.8 % (ref 18–48)
MCH RBC QN AUTO: 31.5 PG (ref 27–31)
MCHC RBC AUTO-ENTMCNC: 33.3 G/DL (ref 32–36)
MCV RBC AUTO: 95 FL (ref 82–98)
MONOCYTES # BLD AUTO: 0.4 K/UL (ref 0.3–1)
MONOCYTES NFR BLD: 9.2 % (ref 4–15)
NEUTROPHILS # BLD AUTO: 2.2 K/UL (ref 1.8–7.7)
NEUTROPHILS NFR BLD: 46.4 % (ref 38–73)
NRBC BLD-RTO: 0 /100 WBC
PLATELET # BLD AUTO: 273 K/UL (ref 150–450)
PMV BLD AUTO: 10.3 FL (ref 9.2–12.9)
RBC # BLD AUTO: 4.57 M/UL (ref 4–5.4)
WBC # BLD AUTO: 4.66 K/UL (ref 3.9–12.7)

## 2021-05-06 PROCEDURE — 3008F BODY MASS INDEX DOCD: CPT | Mod: CPTII,S$GLB,, | Performed by: SPECIALIST

## 2021-05-06 PROCEDURE — 99204 PR OFFICE/OUTPT VISIT, NEW, LEVL IV, 45-59 MIN: ICD-10-PCS | Mod: S$GLB,,, | Performed by: SPECIALIST

## 2021-05-06 PROCEDURE — 3008F PR BODY MASS INDEX (BMI) DOCUMENTED: ICD-10-PCS | Mod: CPTII,S$GLB,, | Performed by: SPECIALIST

## 2021-05-06 PROCEDURE — 86317 IMMUNOASSAY INFECTIOUS AGENT: CPT | Mod: 59 | Performed by: SPECIALIST

## 2021-05-06 PROCEDURE — 99204 OFFICE O/P NEW MOD 45 MIN: CPT | Mod: S$GLB,,, | Performed by: SPECIALIST

## 2021-05-06 PROCEDURE — 82784 ASSAY IGA/IGD/IGG/IGM EACH: CPT | Mod: 59 | Performed by: SPECIALIST

## 2021-05-06 PROCEDURE — 99999 PR PBB SHADOW E&M-EST. PATIENT-LVL III: ICD-10-PCS | Mod: PBBFAC,,, | Performed by: SPECIALIST

## 2021-05-06 PROCEDURE — 82784 ASSAY IGA/IGD/IGG/IGM EACH: CPT | Performed by: SPECIALIST

## 2021-05-06 PROCEDURE — 99999 PR PBB SHADOW E&M-EST. PATIENT-LVL III: CPT | Mod: PBBFAC,,, | Performed by: SPECIALIST

## 2021-05-06 PROCEDURE — 83520 IMMUNOASSAY QUANT NOS NONAB: CPT | Performed by: SPECIALIST

## 2021-05-06 PROCEDURE — 1126F AMNT PAIN NOTED NONE PRSNT: CPT | Mod: S$GLB,,, | Performed by: SPECIALIST

## 2021-05-06 PROCEDURE — 82785 ASSAY OF IGE: CPT | Performed by: SPECIALIST

## 2021-05-06 PROCEDURE — 86317 IMMUNOASSAY INFECTIOUS AGENT: CPT | Performed by: SPECIALIST

## 2021-05-06 PROCEDURE — 85025 COMPLETE CBC W/AUTO DIFF WBC: CPT | Performed by: SPECIALIST

## 2021-05-06 PROCEDURE — 1126F PR PAIN SEVERITY QUANTIFIED, NO PAIN PRESENT: ICD-10-PCS | Mod: S$GLB,,, | Performed by: SPECIALIST

## 2021-05-07 ENCOUNTER — HOSPITAL ENCOUNTER (OUTPATIENT)
Dept: RADIOLOGY | Facility: HOSPITAL | Age: 66
Discharge: HOME | End: 2021-05-07
Attending: UROLOGY
Payer: COMMERCIAL

## 2021-05-07 DIAGNOSIS — N28.1 RENAL CYST: ICD-10-CM

## 2021-05-07 LAB — IGE SERPL-ACNC: <35 IU/ML (ref 0–100)

## 2021-05-07 PROCEDURE — 76770 US EXAM ABDO BACK WALL COMP: CPT | Mod: TC

## 2021-05-07 PROCEDURE — 76770 US RETROPERITONEAL COMPLETE: ICD-10-PCS | Mod: 26,,, | Performed by: RADIOLOGY

## 2021-05-07 PROCEDURE — 76770 US EXAM ABDO BACK WALL COMP: CPT | Mod: 26,,, | Performed by: RADIOLOGY

## 2021-05-10 ENCOUNTER — PATIENT MESSAGE (OUTPATIENT)
Dept: ALLERGY | Facility: CLINIC | Age: 66
End: 2021-05-10

## 2021-05-10 LAB
DIPHTHERIA IGG VALUE: 0.27 IU/ML
DIPHTHERIA TOXOID IGG ANTIBODY: POSITIVE
TETANUS TOXOID IGG AB: POSITIVE
TETANUS TOXOID IGG VALUE: 1.75 IU/ML

## 2021-05-11 LAB — TRYPTASE LEVEL: 5.1 NG/ML

## 2021-05-12 ENCOUNTER — OFFICE VISIT (OUTPATIENT)
Dept: UROLOGY | Facility: CLINIC | Age: 66
End: 2021-05-12
Payer: COMMERCIAL

## 2021-05-12 ENCOUNTER — TELEPHONE (OUTPATIENT)
Dept: ALLERGY | Facility: CLINIC | Age: 66
End: 2021-05-12

## 2021-05-12 VITALS
BODY MASS INDEX: 25.13 KG/M2 | HEIGHT: 65 IN | WEIGHT: 150.81 LBS | SYSTOLIC BLOOD PRESSURE: 130 MMHG | DIASTOLIC BLOOD PRESSURE: 90 MMHG

## 2021-05-12 DIAGNOSIS — N28.1 RENAL CYST: Primary | ICD-10-CM

## 2021-05-12 PROCEDURE — 1125F AMNT PAIN NOTED PAIN PRSNT: CPT | Mod: S$GLB,,, | Performed by: UROLOGY

## 2021-05-12 PROCEDURE — 99212 PR OFFICE/OUTPT VISIT, EST, LEVL II, 10-19 MIN: ICD-10-PCS | Mod: S$GLB,,, | Performed by: UROLOGY

## 2021-05-12 PROCEDURE — 99999 PR PBB SHADOW E&M-EST. PATIENT-LVL III: CPT | Mod: PBBFAC,,, | Performed by: UROLOGY

## 2021-05-12 PROCEDURE — 99212 OFFICE O/P EST SF 10 MIN: CPT | Mod: S$GLB,,, | Performed by: UROLOGY

## 2021-05-12 PROCEDURE — 99999 PR PBB SHADOW E&M-EST. PATIENT-LVL III: ICD-10-PCS | Mod: PBBFAC,,, | Performed by: UROLOGY

## 2021-05-12 PROCEDURE — 3008F PR BODY MASS INDEX (BMI) DOCUMENTED: ICD-10-PCS | Mod: CPTII,S$GLB,, | Performed by: UROLOGY

## 2021-05-12 PROCEDURE — 1125F PR PAIN SEVERITY QUANTIFIED, PAIN PRESENT: ICD-10-PCS | Mod: S$GLB,,, | Performed by: UROLOGY

## 2021-05-12 PROCEDURE — 3008F BODY MASS INDEX DOCD: CPT | Mod: CPTII,S$GLB,, | Performed by: UROLOGY

## 2021-05-12 RX ORDER — ESCITALOPRAM OXALATE 10 MG/1
10 TABLET ORAL
COMMUNITY
Start: 2021-05-10 | End: 2023-11-01

## 2021-05-12 RX ORDER — BUTALBITAL, ACETAMINOPHEN AND CAFFEINE 50; 325; 40 MG/1; MG/1; MG/1
1 TABLET ORAL EVERY 8 HOURS PRN
COMMUNITY
Start: 2021-05-10 | End: 2021-05-20

## 2021-05-13 LAB
S PNEUM DA 1 IGG SER-MCNC: 23.3 MCG/ML
S PNEUM DA 10A IGG SER-MCNC: 6.2 MCG/ML
S PNEUM DA 11A IGG SER-MCNC: 2.2 MCG/ML
S PNEUM DA 12F IGG SER-MCNC: 0.8 MCG/ML
S PNEUM DA 14 IGG SER-MCNC: 3.2 MCG/ML
S PNEUM DA 15B IGG SER-MCNC: 4.8 MCG/ML
S PNEUM DA 17F IGG SER-MCNC: 12.5 MCG/ML
S PNEUM DA 18C IGG SER-MCNC: 1.8 MCG/ML
S PNEUM DA 19A IGG SER-MCNC: 7.1 MCG/ML
S PNEUM DA 2 IGG SER-MCNC: 2.5 MCG/ML
S PNEUM DA 20A IGG SER-MCNC: 1.4 MCG/ML
S PNEUM DA 22F IGG SER-MCNC: 35.1 MCG/ML
S PNEUM DA 23F IGG SER-MCNC: 55.2 MCG/ML
S PNEUM DA 3 IGG SER-MCNC: 5.2 MCG/ML
S PNEUM DA 33F IGG SER-MCNC: 1.3 MCG/ML
S PNEUM DA 4 IGG SER-MCNC: 1.3 MCG/ML
S PNEUM DA 5 IGG SER-MCNC: 11.7 MCG/ML
S PNEUM DA 6B IGG SER-MCNC: 2.1 MCG/ML
S PNEUM DA 7F IGG SER-MCNC: 6.6 MCG/ML
S PNEUM DA 8 IGG SER-MCNC: 1.9 MCG/ML
S PNEUM DA 9N IGG SER-MCNC: NORMAL UG/ML
S PNEUM DA 9V IGG SER-MCNC: 4.2 MCG/ML
S.PNEUMONIAE TYPE 19F: 21.7 MCG/ML

## 2021-05-20 ENCOUNTER — LAB VISIT (OUTPATIENT)
Dept: LAB | Facility: HOSPITAL | Age: 66
End: 2021-05-20
Payer: COMMERCIAL

## 2021-05-20 DIAGNOSIS — R51.9 NONINTRACTABLE EPISODIC HEADACHE, UNSPECIFIED HEADACHE TYPE: ICD-10-CM

## 2021-05-20 DIAGNOSIS — R53.81 MALAISE: ICD-10-CM

## 2021-05-20 PROCEDURE — 83497 ASSAY OF 5-HIAA: CPT | Performed by: SPECIALIST

## 2021-05-20 PROCEDURE — 83088 ASSAY OF HISTAMINE: CPT | Performed by: SPECIALIST

## 2021-05-20 PROCEDURE — 82384 ASSAY THREE CATECHOLAMINES: CPT | Performed by: SPECIALIST

## 2021-05-22 LAB
COLLECT DURATION TIME SPEC: 24 HR
CREAT UR-MCNC: 173 MG/DL
HISTAMINE 24H UR-MRATE: 21 UG/D (ref 0–60)
HISTAMINE UR-SCNC: 206 NMOL/L
HISTAMINE/CREAT 24H UR-SRTO: 119 NMOL/G CRT (ref 0–450)
SPECIMEN VOL ?TM UR: 925 ML

## 2021-05-24 LAB
COLLECT DURATION TIME UR: 24 H
DOPAMINE 24H UR-MRATE: 100 MCG/24 H (ref 65–400)
EPINEPH 24H UR-MRATE: 5.6 MCG/24 H
NOREPINEPH 24H UR-MRATE: 36 MCG/24 H (ref 15–80)
SPECIMEN VOL 24H UR: 925 ML

## 2021-05-25 LAB
5OH-INDOLEACETATE 24H UR-MRATE: 4.3 MG/24 H
COLLECT DURATION TIME UR: 24 H
SPECIMEN VOL 24H UR: 925 ML

## 2021-05-26 ENCOUNTER — OFFICE VISIT (OUTPATIENT)
Dept: ALLERGY | Facility: CLINIC | Age: 66
End: 2021-05-26
Payer: COMMERCIAL

## 2021-05-26 VITALS
SYSTOLIC BLOOD PRESSURE: 156 MMHG | HEART RATE: 106 BPM | BODY MASS INDEX: 25.42 KG/M2 | WEIGHT: 152.56 LBS | TEMPERATURE: 99 F | DIASTOLIC BLOOD PRESSURE: 87 MMHG | HEIGHT: 65 IN

## 2021-05-26 DIAGNOSIS — R09.81 NASAL CONGESTION: ICD-10-CM

## 2021-05-26 DIAGNOSIS — D70.4 CYCLIC NEUTROPENIA: ICD-10-CM

## 2021-05-26 DIAGNOSIS — I10 HYPERTENSION, UNSPECIFIED TYPE: ICD-10-CM

## 2021-05-26 DIAGNOSIS — R53.81 MALAISE: ICD-10-CM

## 2021-05-26 DIAGNOSIS — J32.9 RECURRENT SINUSITIS: Primary | ICD-10-CM

## 2021-05-26 PROCEDURE — 99214 OFFICE O/P EST MOD 30 MIN: CPT | Mod: S$GLB,,, | Performed by: SPECIALIST

## 2021-05-26 PROCEDURE — 3008F PR BODY MASS INDEX (BMI) DOCUMENTED: ICD-10-PCS | Mod: CPTII,S$GLB,, | Performed by: SPECIALIST

## 2021-05-26 PROCEDURE — 3008F BODY MASS INDEX DOCD: CPT | Mod: CPTII,S$GLB,, | Performed by: SPECIALIST

## 2021-05-26 PROCEDURE — 99999 PR PBB SHADOW E&M-EST. PATIENT-LVL III: ICD-10-PCS | Mod: PBBFAC,,, | Performed by: SPECIALIST

## 2021-05-26 PROCEDURE — 99214 PR OFFICE/OUTPT VISIT, EST, LEVL IV, 30-39 MIN: ICD-10-PCS | Mod: S$GLB,,, | Performed by: SPECIALIST

## 2021-05-26 PROCEDURE — 99999 PR PBB SHADOW E&M-EST. PATIENT-LVL III: CPT | Mod: PBBFAC,,, | Performed by: SPECIALIST

## 2021-09-23 NOTE — TELEPHONE ENCOUNTER
I spoke with the patient and was able to get her scheduled with Dr. Lopez on next Wednesday, 11/18, at 1:00.  She verbalized understanding of date, time and location.  
Please call and schedule patient to see me next week at [facility], OK to overbook.  Dx:  Neck mass.  
Oriented - self; Oriented - place; Oriented - time

## 2021-12-17 ENCOUNTER — IMMUNIZATION (OUTPATIENT)
Dept: PRIMARY CARE CLINIC | Facility: CLINIC | Age: 66
End: 2021-12-17
Payer: COMMERCIAL

## 2021-12-17 DIAGNOSIS — Z23 NEED FOR VACCINATION: Primary | ICD-10-CM

## 2021-12-17 PROCEDURE — 0064A COVID-19, MRNA, LNP-S, PF, 100 MCG/0.25 ML DOSE VACCINE (MODERNA BOOSTER): CPT | Mod: CV19,PBBFAC | Performed by: FAMILY MEDICINE

## 2022-01-05 ENCOUNTER — TELEPHONE (OUTPATIENT)
Dept: OBSTETRICS AND GYNECOLOGY | Facility: CLINIC | Age: 67
End: 2022-01-05
Payer: COMMERCIAL

## 2022-01-05 DIAGNOSIS — Z12.31 ENCOUNTER FOR SCREENING MAMMOGRAM FOR MALIGNANT NEOPLASM OF BREAST: Primary | ICD-10-CM

## 2022-01-05 NOTE — TELEPHONE ENCOUNTER
----- Message from Gayathri Castillo sent at 1/4/2022  4:24 PM CST -----  Who Called: Patient    What is the reqeust in detail: Requesting call back to have mammo orders placed and scheduled on the same day as an Annual check up visit with Dr. Lala. Patient would like to complete both appointments on the same day. Please advise.     Can the clinic reply by [facility]? No    Best Call Back Number: [phone]    Additional Information: Please advise.

## 2022-03-31 ENCOUNTER — HOSPITAL ENCOUNTER (OUTPATIENT)
Dept: RADIOLOGY | Facility: HOSPITAL | Age: 67
Discharge: HOME | End: 2022-03-31
Attending: OBSTETRICS & GYNECOLOGY
Payer: COMMERCIAL

## 2022-03-31 DIAGNOSIS — Z12.31 ENCOUNTER FOR SCREENING MAMMOGRAM FOR MALIGNANT NEOPLASM OF BREAST: ICD-10-CM

## 2022-03-31 PROCEDURE — 77067 SCR MAMMO BI INCL CAD: CPT | Mod: 26,,, | Performed by: RADIOLOGY

## 2022-03-31 PROCEDURE — 77063 MAMMO DIGITAL SCREENING BILAT WITH TOMO: ICD-10-PCS | Mod: 26,,, | Performed by: RADIOLOGY

## 2022-03-31 PROCEDURE — 77067 MAMMO DIGITAL SCREENING BILAT WITH TOMO: ICD-10-PCS | Mod: 26,,, | Performed by: RADIOLOGY

## 2022-03-31 PROCEDURE — 77063 BREAST TOMOSYNTHESIS BI: CPT | Mod: 26,,, | Performed by: RADIOLOGY

## 2022-03-31 PROCEDURE — 77067 SCR MAMMO BI INCL CAD: CPT | Mod: TC

## 2023-01-09 RX ORDER — BALOXAVIR MARBOXIL 40 MG/1
40 TABLET, FILM COATED ORAL ONCE
Qty: 1 TABLET | Refills: 0 | Status: SHIPPED | OUTPATIENT
Start: 2023-01-09 | End: 2023-01-09

## 2023-01-12 ENCOUNTER — HOSPITAL ENCOUNTER (OUTPATIENT)
Dept: RADIOLOGY | Facility: HOSPITAL | Age: 68
Discharge: HOME | End: 2023-01-12
Attending: ORTHOPAEDIC SURGERY
Payer: COMMERCIAL

## 2023-01-12 ENCOUNTER — TELEPHONE (OUTPATIENT)
Dept: OTOLARYNGOLOGY | Facility: CLINIC | Age: 68
End: 2023-01-12
Payer: COMMERCIAL

## 2023-01-12 DIAGNOSIS — R05.9 COUGH, UNSPECIFIED TYPE: Primary | ICD-10-CM

## 2023-01-12 DIAGNOSIS — R05.9 COUGH, UNSPECIFIED TYPE: ICD-10-CM

## 2023-01-12 PROCEDURE — 71046 X-RAY EXAM CHEST 2 VIEWS: CPT | Mod: 26,,, | Performed by: STUDENT IN AN ORGANIZED HEALTH CARE EDUCATION/TRAINING PROGRAM

## 2023-01-12 PROCEDURE — 71046 XR CHEST PA AND LATERAL: ICD-10-PCS | Mod: 26,,, | Performed by: STUDENT IN AN ORGANIZED HEALTH CARE EDUCATION/TRAINING PROGRAM

## 2023-01-12 PROCEDURE — 71046 X-RAY EXAM CHEST 2 VIEWS: CPT | Mod: TC

## 2023-01-13 ENCOUNTER — TELEPHONE (OUTPATIENT)
Dept: OTOLARYNGOLOGY | Facility: CLINIC | Age: 68
End: 2023-01-13
Payer: COMMERCIAL

## 2023-01-13 NOTE — TELEPHONE ENCOUNTER
----- Message from Jillian Lopez MD sent at 1/13/2023  1:16 PM CST -----  Please let patient know that her CXR is normal.  Thanks!

## 2023-11-01 ENCOUNTER — HOSPITAL ENCOUNTER (INPATIENT)
Facility: HOSPITAL | Age: 68
LOS: 2 days | Discharge: HOME | DRG: 419 | End: 2023-11-05
Attending: EMERGENCY MEDICINE | Admitting: STUDENT IN AN ORGANIZED HEALTH CARE EDUCATION/TRAINING PROGRAM
Payer: COMMERCIAL

## 2023-11-01 DIAGNOSIS — R10.11 RIGHT UPPER QUADRANT PAIN: ICD-10-CM

## 2023-11-01 DIAGNOSIS — R10.11 INTRACTABLE RIGHT UPPER QUADRANT ABDOMINAL PAIN: ICD-10-CM

## 2023-11-01 DIAGNOSIS — K81.0 ACUTE CHOLECYSTITIS: Primary | ICD-10-CM

## 2023-11-01 LAB
ALBUMIN SERPL BCP-MCNC: 4.3 G/DL (ref 3.5–5.2)
ALP SERPL-CCNC: 77 U/L (ref 55–135)
ALT SERPL W/O P-5'-P-CCNC: 31 U/L (ref 10–44)
ANION GAP SERPL CALC-SCNC: 15 MMOL/L (ref 8–16)
AST SERPL-CCNC: 26 U/L (ref 10–40)
BASOPHILS # BLD AUTO: 0.05 K/UL (ref 0–0.2)
BASOPHILS NFR BLD: 0.8 % (ref 0–1.9)
BILIRUB SERPL-MCNC: 1.6 MG/DL (ref 0.1–1)
BILIRUB UR QL STRIP: NEGATIVE
BUN SERPL-MCNC: 14 MG/DL (ref 8–23)
CALCIUM SERPL-MCNC: 10.1 MG/DL (ref 8.7–10.5)
CHLORIDE SERPL-SCNC: 104 MMOL/L (ref 95–110)
CLARITY UR: CLEAR
CO2 SERPL-SCNC: 25 MMOL/L (ref 23–29)
COLOR UR: YELLOW
CREAT SERPL-MCNC: 0.8 MG/DL (ref 0.5–1.4)
DIFFERENTIAL METHOD: ABNORMAL
EOSINOPHIL # BLD AUTO: 0.1 K/UL (ref 0–0.5)
EOSINOPHIL NFR BLD: 1.9 % (ref 0–8)
ERYTHROCYTE [DISTWIDTH] IN BLOOD BY AUTOMATED COUNT: 12.3 % (ref 11.5–14.5)
ERYTHROCYTE [DISTWIDTH] IN BLOOD BY AUTOMATED COUNT: 12.3 % (ref 11.5–14.5)
EST. GFR  (NO RACE VARIABLE): >60 ML/MIN/1.73 M^2
GLUCOSE SERPL-MCNC: 136 MG/DL (ref 70–110)
GLUCOSE UR QL STRIP: NEGATIVE
HCT VFR BLD AUTO: 40.1 % (ref 37–48.5)
HCT VFR BLD AUTO: 42.4 % (ref 37–48.5)
HGB BLD-MCNC: 13.8 G/DL (ref 12–16)
HGB BLD-MCNC: 14.4 G/DL (ref 12–16)
HGB UR QL STRIP: NEGATIVE
IMM GRANULOCYTES # BLD AUTO: 0.02 K/UL (ref 0–0.04)
IMM GRANULOCYTES NFR BLD AUTO: 0.3 % (ref 0–0.5)
KETONES UR QL STRIP: NEGATIVE
LEUKOCYTE ESTERASE UR QL STRIP: NEGATIVE
LIPASE SERPL-CCNC: 35 U/L (ref 4–60)
LYMPHOCYTES # BLD AUTO: 1.8 K/UL (ref 1–4.8)
LYMPHOCYTES NFR BLD: 29 % (ref 18–48)
MCH RBC QN AUTO: 31.8 PG (ref 27–31)
MCH RBC QN AUTO: 32.2 PG (ref 27–31)
MCHC RBC AUTO-ENTMCNC: 34 G/DL (ref 32–36)
MCHC RBC AUTO-ENTMCNC: 34.4 G/DL (ref 32–36)
MCV RBC AUTO: 94 FL (ref 82–98)
MCV RBC AUTO: 94 FL (ref 82–98)
MONOCYTES # BLD AUTO: 0.6 K/UL (ref 0.3–1)
MONOCYTES NFR BLD: 10 % (ref 4–15)
NEUTROPHILS # BLD AUTO: 3.6 K/UL (ref 1.8–7.7)
NEUTROPHILS NFR BLD: 58 % (ref 38–73)
NITRITE UR QL STRIP: NEGATIVE
NRBC BLD-RTO: 0 /100 WBC
PH UR STRIP: 6 [PH] (ref 5–8)
PLATELET # BLD AUTO: 209 K/UL (ref 150–450)
PLATELET # BLD AUTO: 223 K/UL (ref 150–450)
PMV BLD AUTO: 10 FL (ref 9.2–12.9)
PMV BLD AUTO: 10 FL (ref 9.2–12.9)
POTASSIUM SERPL-SCNC: 2.9 MMOL/L (ref 3.5–5.1)
PROT SERPL-MCNC: 7.5 G/DL (ref 6–8.4)
PROT UR QL STRIP: NEGATIVE
RBC # BLD AUTO: 4.29 M/UL (ref 4–5.4)
RBC # BLD AUTO: 4.53 M/UL (ref 4–5.4)
SODIUM SERPL-SCNC: 144 MMOL/L (ref 136–145)
SP GR UR STRIP: 1.01 (ref 1–1.03)
URN SPEC COLLECT METH UR: NORMAL
UROBILINOGEN UR STRIP-ACNC: NEGATIVE EU/DL
WBC # BLD AUTO: 6.18 K/UL (ref 3.9–12.7)
WBC # BLD AUTO: 8.85 K/UL (ref 3.9–12.7)

## 2023-11-01 PROCEDURE — G0378 HOSPITAL OBSERVATION PER HR: HCPCS

## 2023-11-01 PROCEDURE — 96374 THER/PROPH/DIAG INJ IV PUSH: CPT

## 2023-11-01 PROCEDURE — 83690 ASSAY OF LIPASE: CPT | Performed by: REGISTERED NURSE

## 2023-11-01 PROCEDURE — 63600175 PHARM REV CODE 636 W HCPCS: Performed by: STUDENT IN AN ORGANIZED HEALTH CARE EDUCATION/TRAINING PROGRAM

## 2023-11-01 PROCEDURE — 99222 1ST HOSP IP/OBS MODERATE 55: CPT | Mod: ,,, | Performed by: STUDENT IN AN ORGANIZED HEALTH CARE EDUCATION/TRAINING PROGRAM

## 2023-11-01 PROCEDURE — 63600175 PHARM REV CODE 636 W HCPCS: Performed by: REGISTERED NURSE

## 2023-11-01 PROCEDURE — 85027 COMPLETE CBC AUTOMATED: CPT | Performed by: STUDENT IN AN ORGANIZED HEALTH CARE EDUCATION/TRAINING PROGRAM

## 2023-11-01 PROCEDURE — 80053 COMPREHEN METABOLIC PANEL: CPT | Performed by: REGISTERED NURSE

## 2023-11-01 PROCEDURE — 25000003 PHARM REV CODE 250: Performed by: STUDENT IN AN ORGANIZED HEALTH CARE EDUCATION/TRAINING PROGRAM

## 2023-11-01 PROCEDURE — 63600175 PHARM REV CODE 636 W HCPCS: Performed by: EMERGENCY MEDICINE

## 2023-11-01 PROCEDURE — 85025 COMPLETE CBC W/AUTO DIFF WBC: CPT | Performed by: REGISTERED NURSE

## 2023-11-01 PROCEDURE — 96361 HYDRATE IV INFUSION ADD-ON: CPT

## 2023-11-01 PROCEDURE — 96375 TX/PRO/DX INJ NEW DRUG ADDON: CPT

## 2023-11-01 PROCEDURE — 99222 PR INITIAL HOSPITAL CARE,LEVL II: ICD-10-PCS | Mod: ,,, | Performed by: STUDENT IN AN ORGANIZED HEALTH CARE EDUCATION/TRAINING PROGRAM

## 2023-11-01 PROCEDURE — 25000003 PHARM REV CODE 250: Performed by: REGISTERED NURSE

## 2023-11-01 PROCEDURE — 36415 COLL VENOUS BLD VENIPUNCTURE: CPT | Performed by: STUDENT IN AN ORGANIZED HEALTH CARE EDUCATION/TRAINING PROGRAM

## 2023-11-01 PROCEDURE — 81003 URINALYSIS AUTO W/O SCOPE: CPT | Performed by: REGISTERED NURSE

## 2023-11-01 RX ORDER — HYDROMORPHONE HYDROCHLORIDE 2 MG/ML
0.2 INJECTION, SOLUTION INTRAMUSCULAR; INTRAVENOUS; SUBCUTANEOUS EVERY 6 HOURS PRN
Status: DISCONTINUED | OUTPATIENT
Start: 2023-11-01 | End: 2023-11-02

## 2023-11-01 RX ORDER — NITROGLYCERIN 0.4 MG/1
0.4 TABLET SUBLINGUAL EVERY 5 MIN PRN
COMMUNITY
End: 2024-01-25

## 2023-11-01 RX ORDER — PANTOPRAZOLE SODIUM 40 MG/1
40 TABLET, DELAYED RELEASE ORAL DAILY
COMMUNITY
Start: 2023-10-31

## 2023-11-01 RX ORDER — ONDANSETRON 2 MG/ML
4 INJECTION INTRAMUSCULAR; INTRAVENOUS
Status: COMPLETED | OUTPATIENT
Start: 2023-11-01 | End: 2023-11-01

## 2023-11-01 RX ORDER — HYDROMORPHONE HYDROCHLORIDE 2 MG/ML
0.5 INJECTION, SOLUTION INTRAMUSCULAR; INTRAVENOUS; SUBCUTANEOUS
Status: COMPLETED | OUTPATIENT
Start: 2023-11-01 | End: 2023-11-01

## 2023-11-01 RX ORDER — POTASSIUM CHLORIDE 7.45 MG/ML
10 INJECTION INTRAVENOUS
Status: COMPLETED | OUTPATIENT
Start: 2023-11-01 | End: 2023-11-02

## 2023-11-01 RX ORDER — MORPHINE SULFATE 4 MG/ML
4 INJECTION, SOLUTION INTRAMUSCULAR; INTRAVENOUS
Status: COMPLETED | OUTPATIENT
Start: 2023-11-01 | End: 2023-11-01

## 2023-11-01 RX ORDER — IRBESARTAN 75 MG/1
75 TABLET ORAL DAILY
COMMUNITY

## 2023-11-01 RX ORDER — MORPHINE SULFATE 4 MG/ML
4 INJECTION, SOLUTION INTRAMUSCULAR; INTRAVENOUS
Status: DISCONTINUED | OUTPATIENT
Start: 2023-11-01 | End: 2023-11-01

## 2023-11-01 RX ORDER — SODIUM CHLORIDE, SODIUM LACTATE, POTASSIUM CHLORIDE, CALCIUM CHLORIDE 600; 310; 30; 20 MG/100ML; MG/100ML; MG/100ML; MG/100ML
INJECTION, SOLUTION INTRAVENOUS CONTINUOUS
Status: DISCONTINUED | OUTPATIENT
Start: 2023-11-01 | End: 2023-11-02

## 2023-11-01 RX ORDER — ONDANSETRON 2 MG/ML
4 INJECTION INTRAMUSCULAR; INTRAVENOUS EVERY 6 HOURS PRN
Status: DISCONTINUED | OUTPATIENT
Start: 2023-11-01 | End: 2023-11-02 | Stop reason: SDUPTHER

## 2023-11-01 RX ADMIN — HYDROMORPHONE HYDROCHLORIDE 0.2 MG: 2 INJECTION INTRAMUSCULAR; INTRAVENOUS; SUBCUTANEOUS at 11:11

## 2023-11-01 RX ADMIN — SODIUM CHLORIDE, POTASSIUM CHLORIDE, SODIUM LACTATE AND CALCIUM CHLORIDE: 600; 310; 30; 20 INJECTION, SOLUTION INTRAVENOUS at 11:11

## 2023-11-01 RX ADMIN — HYDROMORPHONE HYDROCHLORIDE 0.5 MG: 2 INJECTION INTRAMUSCULAR; INTRAVENOUS; SUBCUTANEOUS at 04:11

## 2023-11-01 RX ADMIN — POTASSIUM CHLORIDE 10 MEQ: 7.46 INJECTION, SOLUTION INTRAVENOUS at 09:11

## 2023-11-01 RX ADMIN — ONDANSETRON 4 MG: 2 INJECTION INTRAMUSCULAR; INTRAVENOUS at 08:11

## 2023-11-01 RX ADMIN — MORPHINE SULFATE 4 MG: 4 INJECTION INTRAVENOUS at 03:11

## 2023-11-01 RX ADMIN — POTASSIUM CHLORIDE 10 MEQ: 7.46 INJECTION, SOLUTION INTRAVENOUS at 08:11

## 2023-11-01 RX ADMIN — ONDANSETRON 4 MG: 2 INJECTION INTRAMUSCULAR; INTRAVENOUS at 11:11

## 2023-11-01 RX ADMIN — SODIUM CHLORIDE 1000 ML: 9 INJECTION, SOLUTION INTRAVENOUS at 03:11

## 2023-11-01 RX ADMIN — ONDANSETRON 4 MG: 2 INJECTION INTRAMUSCULAR; INTRAVENOUS at 03:11

## 2023-11-01 RX ADMIN — PIPERACILLIN AND TAZOBACTAM 4.5 G: 4; .5 INJECTION, POWDER, LYOPHILIZED, FOR SOLUTION INTRAVENOUS; PARENTERAL at 08:11

## 2023-11-01 NOTE — FIRST PROVIDER EVALUATION
"Medical screening examination initiated.  I have conducted a focused provider triage encounter, findings are as follows:    Brief history of present illness:  Right upper quadrant/flank pain.  Patient currently waiting on HIDA for suspected delayed gallbladder emptying.    Vitals:    11/01/23 1500   BP: (!) 165/73   BP Location: Right arm   Patient Position: Sitting   Pulse: 105   Resp: 18   Temp: 98.4 °F (36.9 °C)   TempSrc: Oral   SpO2: 98%   Weight: 70.5 kg (155 lb 5 oz)   Height: 5' 7" (1.702 m)       Pertinent physical exam:  No acute distress, vital signs stable    Brief workup plan:  Workup    Preliminary workup initiated; this workup will be continued and followed by the physician or advanced practice provider that is assigned to the patient when roomed.  "

## 2023-11-01 NOTE — PHARMACY MED REC
"Admission Medication History     The home medication history was taken by Estelita Sterling.    You may go to "Admission" then "Reconcile Home Medications" tabs to review and/or act upon these items.     The home medication list has been updated by the Pharmacy department.   Please read ALL comments highlighted in yellow.   Please address this information as you see fit.    Feel free to contact us if you have any questions or require assistance.      The medications listed below were removed from the home medication list. Please reorder if appropriate:  Patient reports no longer taking the following medication(s):  Wellbutrin 300mg  Lexapro 10mg  Flonase 50mcg    Medications listed below were obtained from: Patient/family and Analytic software- [employer]  (Not in a hospital admission)      Estelita Sterling  [id number]      Current Outpatient Medications on File Prior to Encounter   Medication Sig Dispense Refill Last Dose    amlodipine (NORVASC) 10 MG tablet Take 10 mg by mouth once daily.   11/1/2023    chlorthalidone (HYGROTEN) 25 MG Tab Take 12.5 mg by mouth once daily.   11/1/2023    irbesartan (AVAPRO) 75 MG tablet Take 150 mg by mouth once daily.    11/1/2023    irbesartan (AVAPRO) 75 MG tablet Take 75 mg by mouth once daily.   11/1/2023    pantoprazole (PROTONIX) 40 MG tablet Take 40 mg by mouth once daily.   11/1/2023    tafluprost, PF, (ZIOPTAN, PF,) 0.0015 % Dpet Apply 1 drop to eye once daily.   11/1/2023    nitroGLYCERIN (NITROSTAT) 0.4 MG SL tablet Take 0.4 mg by mouth every 5 (five) minutes as needed.   Unknown                           .          "

## 2023-11-01 NOTE — H&P
[facility] - Emergency Dept.  General Surgery  Consult Note    Patient Name: Jemma Pastrana  MRN: [id number]  Admission Date: 2023  Hospital Length of Stay: 0 days  Attending Physician: Missy Harrison MD  Primary Care Provider: Hugh Middleton MD    Consults  Subjective:     Chief Complaint/Reason for Admission: RUQ pain    History of Present Illness: Pt is a 69yo F with known biliary colic who presents with 24h intractable RUQ pain. Reports sharp and worsened with fatty foods. Has associated chills and nausea. Denies fevers or vomiting. Pain had some relief with several doses of narcotic pain medications.    Current Facility-Administered Medications   Medication    ondansetron injection 4 mg     Current Outpatient Medications   Medication Sig    amlodipine (NORVASC) 10 MG tablet Take 10 mg by mouth once daily.    chlorthalidone (HYGROTEN) 25 MG Tab Take 12.5 mg by mouth once daily.    irbesartan (AVAPRO) 75 MG tablet Take 150 mg by mouth once daily.     irbesartan (AVAPRO) 75 MG tablet Take 75 mg by mouth once daily.    pantoprazole (PROTONIX) 40 MG tablet Take 40 mg by mouth once daily.    tafluprost, PF, (ZIOPTAN, PF,) 0.0015 % Dpet Apply 1 drop to eye once daily.    nitroGLYCERIN (NITROSTAT) 0.4 MG SL tablet Take 0.4 mg by mouth every 5 (five) minutes as needed.       Review of patient's allergies indicates:   Allergen Reactions    Procardia [nifedipine] Shortness Of Breath       Past Medical History:   Diagnosis Date    Depression     Hypertension     Osteoporosis     Recurrent upper respiratory infection (URI)     Urolithiasis      Past Surgical History:   Procedure Laterality Date    ADENOIDECTOMY      BLADDER SUSPENSION       SECTION, LOW TRANSVERSE      x 1    HYSTERECTOMY      OOPHORECTOMY      bilateral with hysterectomy, benign     Robotic assisted laparoscopic anterior colposacropexy      TONSILLECTOMY      Umbilical Hernia Closure       Family History       Problem Relation (Age of  Onset)    Prostate cancer Father          Tobacco Use    Smoking status: Former     Current packs/day: 0.00     Types: Cigarettes     Quit date: 1983     Years since quittin.2    Smokeless tobacco: Never   Substance and Sexual Activity    Alcohol use: Not Currently     Comment: On ocassion    Drug use: No    Sexual activity: Yes     Objective:     Vital Signs (Most Recent):  Temp: 98.4 °F (36.9 °C) (23 1500)  Pulse: 98 (23 1758)  Resp: 18 (23 1646)  BP: (!) 151/70 (23 1809)  SpO2: 99 % (23 1758) Vital Signs (24h Range):  Temp:  [98.4 °F (36.9 °C)] 98.4 °F (36.9 °C)  Pulse:  [] 98  Resp:  [18] 18  SpO2:  [98 %-99 %] 99 %  BP: (151-165)/(70-73) 151/70     Weight: 70.5 kg (155 lb 5 oz)  Body mass index is 24.33 kg/m².    Physical Exam  Constitutional:       Appearance: She is well-developed.   HENT:      Head: Normocephalic and atraumatic.   Eyes:      Conjunctiva/sclera: Conjunctivae normal.      Pupils: Pupils are equal, round, and reactive to light.   Neck:      Thyroid: No thyromegaly.   Cardiovascular:      Rate and Rhythm: Normal rate and regular rhythm.   Pulmonary:      Effort: Pulmonary effort is normal. No respiratory distress.   Abdominal:      General: There is no distension.      Comments: Distended, TTP in RUQ with voluntary guarding.+ méndez. No masses or hernia   Musculoskeletal:         General: No tenderness. Normal range of motion.      Cervical back: Normal range of motion.   Skin:     General: Skin is warm and dry.      Capillary Refill: Capillary refill takes less than 2 seconds.   Neurological:      General: No focal deficit present.      Mental Status: She is alert and oriented to person, place, and time.        Significant labs: WBC 6, Hb 14, K 2.9, Tbili 1.6    US; stones and sludge impacted in neck of gallbladder. CBD 4mm      Assessment/Plan:     Active Diagnoses:    Diagnosis Date Noted POA    PRINCIPAL PROBLEM:  Acute cholecystitis [K81.0]  11/01/2023 Unknown      Problems Resolved During this Admission:       - admit to general surgery  - multimodal pain control  - IVF + K replacement  - zosyn  - will plan for robotic cholecystectomy tomorrow  - ok for CLD tonight for comfort  - NPO at midnight    Thank you for your consult.  Will admit    Missy Harrison MD  Colorectal Surgery  [facility] - Emergency Dept.

## 2023-11-01 NOTE — ED PROVIDER NOTES
"SCRIBE #1 NOTE: I, Johanna Davis, am scribing for, and in the presence of, Julio César Mayer Jr., MD. I have scribed the HPI, ROS,  and PEx.     SCRIBE #2 NOTE: I, Amisha Catherine, am scribing for, and in the presence of,  Robert Archer MD.. I have scribed the remaining portions of the note not scribed by Scribe #1.      History     Chief Complaint   Patient presents with    Flank Pain     Pt c/o R flank pain since , gallbladder issue, same sx     Review of patient's allergies indicates:   Allergen Reactions    Procardia [nifedipine] Shortness Of Breath         History of Present Illness     HPI    2023, 3:29 PM  History obtained from the patient      History of Present Illness: Jemma Pastrana is a 68 y.o. female patient with a PMHx of HTN, Osteoporosis, and Urolithiasis who presents to the Emergency Department for evaluation of  RUQ pain which began in . Pt reports that her pain radiates into her back and has been worsening. PCP told her after sonogram that she has no gallstones but potentially has a "lazy gallbladder" and may need removal. Symptoms are intermittent and moderate in severity. No mitigating or exacerbating factors reported. Associated sxs include bloating and nausea. Patient denies any dysuria, fever, SOB, chills, vomiting, and all other sxs at this time. No prior tx reported. No further complaints or concerns at this time.       Arrival mode: Personal vehicle    PCP: Hugh Middleton MD        Past Medical History:  Past Medical History:   Diagnosis Date    Depression     Hypertension     Osteoporosis     Recurrent upper respiratory infection (URI)     Urolithiasis        Past Surgical History:  Past Surgical History:   Procedure Laterality Date    ADENOIDECTOMY      BLADDER SUSPENSION       SECTION, LOW TRANSVERSE      x 1    HYSTERECTOMY      OOPHORECTOMY      bilateral with hysterectomy, benign     Robotic assisted laparoscopic anterior colposacropexy      " TONSILLECTOMY      Umbilical Hernia Closure           Family History:  Family History   Problem Relation Age of Onset    Prostate cancer Father     Allergies Neg Hx        Social History:  Social History     Tobacco Use    Smoking status: Former     Current packs/day: 0.00     Types: Cigarettes     Quit date: 1983     Years since quittin.2    Smokeless tobacco: Never   Substance and Sexual Activity    Alcohol use: Not Currently     Comment: On ocassion    Drug use: No    Sexual activity: Yes        Review of Systems     Review of Systems   Constitutional:  Negative for chills and fever.   HENT:  Negative for sore throat.    Respiratory:  Negative for shortness of breath.    Cardiovascular:  Negative for chest pain.   Gastrointestinal:  Positive for abdominal pain (RUQ) and nausea. Negative for vomiting.        (+) bloating     Genitourinary:  Negative for dysuria.   Musculoskeletal:  Negative for back pain.   Skin:  Negative for rash.   Neurological:  Negative for weakness.   Hematological:  Does not bruise/bleed easily.   All other systems reviewed and are negative.       Physical Exam     Initial Vitals [23 1500]   BP Pulse Resp Temp SpO2   (!) 165/73 105 18 98.4 °F (36.9 °C) 98 %      MAP       --          Physical Exam  Nursing Notes and Vital Signs Reviewed.  Constitutional: Patient is in no acute distress. Well-developed and well-nourished.  Head: Atraumatic. Normocephalic.  Eyes:  EOM intact.  No scleral icterus.  ENT: Mucous membranes are moist.  Nares clear   Neck:  Full ROM. No JVD.  Cardiovascular: Regular rate. Regular rhythm No murmurs, rubs, or gallops. Distal pulses are 2+ and symmetric  Pulmonary/Chest: No respiratory distress. Clear to auscultation bilaterally. No wheezing or rales.  Equal chest wall rise bilaterally  Abdominal: Soft and non-distended.  There is no tenderness.  No rebound, guarding, or rigidity. Good bowel sounds.  Genitourinary: No CVA tenderness.  No suprapubic  tenderness  Musculoskeletal: Moves all extremities. No obvious deformities.  5 x 5 strength in all extremities   Skin: Warm and dry.  Neurological:  Alert, awake, and appropriate.  Normal speech.  No acute focal neurological deficits are appreciated.  Two through 12 intact bilaterally.  Psychiatric: Normal affect. Good eye contact. Appropriate in content.      ED Course   Procedures  ED Vital Signs:  Vitals:    11/02/23 0614 11/02/23 0750 11/02/23 1325 11/02/23 1330   BP:  (!) 149/62 134/61 138/65   Pulse:  106 77 97   Resp: 18 14 (!) 25 (!) 24   Temp:  98.3 °F (36.8 °C) 99 °F (37.2 °C)    TempSrc:  Oral Skin    SpO2:  (!) 83% 96% 99%   Weight:       Height:        11/02/23 1335 11/02/23 1345 11/02/23 1400 11/02/23 1415   BP: (!) 131/59 133/60 (!) 120/55 (!) 115/55   Pulse: 78 77 83 76   Resp: (!) 25 (!) 22 20 20   Temp:       TempSrc:       SpO2: 99% 100% (!) 91% 95%   Weight:       Height:        11/02/23 1430 11/02/23 1457 11/02/23 1554 11/02/23 1632   BP: (!) 116/55 (!) 119/55 (!) 122/59    Pulse: 77 79 71    Resp: 19  16    Temp:  99.6 °F (37.6 °C) 99.8 °F (37.7 °C)    TempSrc:  Oral Oral    SpO2: 95% (!) 93% 96% 95%   Weight:       Height:        11/02/23 1944 11/02/23 1950 11/02/23 2130   BP: 128/60     Pulse: 76     Resp: 18  18   Temp: 99.2 °F (37.3 °C)     TempSrc: Oral     SpO2: (!) 92% (!) 92%    Weight:      Height:          Abnormal Lab Results:  Labs Reviewed   CBC W/ AUTO DIFFERENTIAL - Abnormal; Notable for the following components:       Result Value    MCH 31.8 (*)     All other components within normal limits   COMPREHENSIVE METABOLIC PANEL - Abnormal; Notable for the following components:    Potassium 2.9 (*)     Glucose 136 (*)     Total Bilirubin 1.6 (*)     All other components within normal limits   LIPASE   URINALYSIS, REFLEX TO URINE CULTURE    Narrative:     Specimen Source->Urine        All Lab Results:  Results for orders placed or performed during the hospital encounter of 11/01/23    CBC auto differential   Result Value Ref Range    WBC 6.18 3.90 - 12.70 K/uL    RBC 4.53 4.00 - 5.40 M/uL    Hemoglobin 14.4 12.0 - 16.0 g/dL    Hematocrit 42.4 37.0 - 48.5 %    MCV 94 82 - 98 fL    MCH 31.8 (H) 27.0 - 31.0 pg    MCHC 34.0 32.0 - 36.0 g/dL    RDW 12.3 11.5 - 14.5 %    Platelets 223 150 - 450 K/uL    MPV 10.0 9.2 - 12.9 fL    Immature Granulocytes 0.3 0.0 - 0.5 %    Gran # (ANC) 3.6 1.8 - 7.7 K/uL    Immature Grans (Abs) 0.02 0.00 - 0.04 K/uL    Lymph # 1.8 1.0 - 4.8 K/uL    Mono # 0.6 0.3 - 1.0 K/uL    Eos # 0.1 0.0 - 0.5 K/uL    Baso # 0.05 0.00 - 0.20 K/uL    nRBC 0 0 /100 WBC    Gran % 58.0 38.0 - 73.0 %    Lymph % 29.0 18.0 - 48.0 %    Mono % 10.0 4.0 - 15.0 %    Eosinophil % 1.9 0.0 - 8.0 %    Basophil % 0.8 0.0 - 1.9 %    Differential Method Automated    Comprehensive metabolic panel   Result Value Ref Range    Sodium 144 136 - 145 mmol/L    Potassium 2.9 (L) 3.5 - 5.1 mmol/L    Chloride 104 95 - 110 mmol/L    CO2 25 23 - 29 mmol/L    Glucose 136 (H) 70 - 110 mg/dL    BUN 14 8 - 23 mg/dL    Creatinine 0.8 0.5 - 1.4 mg/dL    Calcium 10.1 8.7 - 10.5 mg/dL    Total Protein 7.5 6.0 - 8.4 g/dL    Albumin 4.3 3.5 - 5.2 g/dL    Total Bilirubin 1.6 (H) 0.1 - 1.0 mg/dL    Alkaline Phosphatase 77 55 - 135 U/L    AST 26 10 - 40 U/L    ALT 31 10 - 44 U/L    eGFR >60 >60 mL/min/1.73 m^2    Anion Gap 15 8 - 16 mmol/L   Lipase   Result Value Ref Range    Lipase 35 4 - 60 U/L   Urinalysis, Reflex to Urine Culture Urine, Clean Catch    Specimen: Urine   Result Value Ref Range    Specimen UA Urine, Clean Catch     Color, UA Yellow Yellow, Straw, Maria D    Appearance, UA Clear Clear    pH, UA 6.0 5.0 - 8.0    Specific Gravity, UA 1.015 1.005 - 1.030    Protein, UA Negative Negative    Glucose, UA Negative Negative    Ketones, UA Negative Negative    Bilirubin (UA) Negative Negative    Occult Blood UA Negative Negative    Nitrite, UA Negative Negative    Urobilinogen, UA Negative <2.0 EU/dL    Leukocytes, UA  Negative Negative   CBC Without Differential   Result Value Ref Range    WBC 8.85 3.90 - 12.70 K/uL    RBC 4.29 4.00 - 5.40 M/uL    Hemoglobin 13.8 12.0 - 16.0 g/dL    Hematocrit 40.1 37.0 - 48.5 %    MCV 94 82 - 98 fL    MCH 32.2 (H) 27.0 - 31.0 pg    MCHC 34.4 32.0 - 36.0 g/dL    RDW 12.3 11.5 - 14.5 %    Platelets 209 150 - 450 K/uL    MPV 10.0 9.2 - 12.9 fL   Comprehensive metabolic panel   Result Value Ref Range    Sodium 143 136 - 145 mmol/L    Potassium 3.2 (L) 3.5 - 5.1 mmol/L    Chloride 105 95 - 110 mmol/L    CO2 27 23 - 29 mmol/L    Glucose 169 (H) 70 - 110 mg/dL    BUN 11 8 - 23 mg/dL    Creatinine 0.7 0.5 - 1.4 mg/dL    Calcium 9.1 8.7 - 10.5 mg/dL    Total Protein 6.5 6.0 - 8.4 g/dL    Albumin 3.7 3.5 - 5.2 g/dL    Total Bilirubin 1.3 (H) 0.1 - 1.0 mg/dL    Alkaline Phosphatase 67 55 - 135 U/L    AST 26 10 - 40 U/L    ALT 26 10 - 44 U/L    eGFR >60 >60 mL/min/1.73 m^2    Anion Gap 11 8 - 16 mmol/L        Imaging Results:  Imaging Results              US Abdomen Limited (Gallbladder) (Final result)  Result time 11/01/23 16:53:16      Final result by Adrian Meneses MD (11/01/23 16:53:16)                   Impression:      No acute abnormality.    Sludge and stones the gallbladder without findings for acute cholecystitis.    Probable liver hemangioma.      Electronically signed by: Adrian Meneses  Date:    11/01/2023  Time:    16:53               Narrative:    EXAMINATION:  US ABDOMEN LIMITED    CLINICAL HISTORY:  Right upper quadrant pain    TECHNIQUE:  Limited ultrasound of the right upper quadrant of the abdomen (including pancreas, liver, gallbladder, common bile duct, and Right kidney) was performed.    COMPARISON:  None.    FINDINGS:  Pancreas: No acute sonographic abnormality.    Liver: Normal in size, measuring 16 cm. Homogeneous echotexture. 1.0 cm hyperechoic focus possibly hemangioma but not definitively characterized.  This appears similar to the prior exam of 08/06/2019 allowing for  "differences in modality.    Gallbladder: Sludge and stones in the neck of the gallbladder.  Parra sign negative per the sonographer.  No wall thickening.    Biliary system: The common duct is not dilated, measuring 4 mm.  No intrahepatic ductal dilatation.    Right kidney: 4.5 cm right upper pole cyst.  Kidneys normal in size measuring 11.0 cm.    Miscellaneous: No upper abdominal ascites.                                                  The Emergency Provider reviewed the vital signs and test results, which are outlined above.     ED Discussion       3:38 PM: Discussed pt's case with Dr. Harrison (Colon and Rectal Surgery) who recommends US and labs and if her pain doesn't get better in ED she will accept admission.    3:43 PM: 3:43 PM: Dr. Mayer transfers care of patient to Dr. Archer pending lab results.     5:24 PM: Discussed case with Dr. Harrison (General surgery). Dr. Harrison agrees with current care and management of pt and accepts admission.   Admitting Service: General surgery  Admitting Physician: Dr. Harrison  Admit to: Obs med surge    5:25 PM: Re-evaluated pt. I have discussed test results, shared treatment plan, and the need for admission with patient and family at bedside. Pt and family express understanding at this time and agree with all information. All questions answered. Pt and family have no further questions or concerns at this time. Pt is ready for admit.             Medical Decision Making   68-year-old white female with right upper quadrant pain radiating to her right back intermittent over the past several months. PCP believes she may have a "sluggish gallbladder.   She is hoping to have her gallbladder removed if her workup is negative however.  Her PCP is out of town.     Amount and/or Complexity of Data Reviewed  Labs: ordered. Decision-making details documented in ED Course.  Radiology: ordered. Decision-making details documented in ED Course.    Risk  Prescription drug " management.                ED Medication(s):  Medications   sodium chloride 0.9% flush 10 mL (has no administration in time range)   ondansetron injection 4 mg (has no administration in time range)   promethazine tablet 12.5 mg (12.5 mg Oral Not Given 11/2/23 1127)   methocarbamoL tablet 750 mg (750 mg Oral Given 11/2/23 2012)   oxyCODONE immediate release tablet 5 mg (has no administration in time range)   acetaminophen tablet 650 mg (650 mg Oral Not Given 11/2/23 2200)   morphine injection 2 mg (has no administration in time range)   ketorolac injection 15 mg (15 mg Intravenous Given 11/2/23 2012)   HYDROmorphone (PF) injection 0.2 mg (0.2 mg Intravenous Given 11/2/23 2130)   morphine injection 4 mg (4 mg Intravenous Given 11/1/23 1542)   ondansetron injection 4 mg (4 mg Intravenous Given 11/1/23 1542)   sodium chloride 0.9% bolus 1,000 mL 1,000 mL (0 mLs Intravenous Stopped 11/1/23 1643)   ondansetron injection 4 mg (4 mg Intravenous Given 11/1/23 2028)   HYDROmorphone (PF) injection 0.5 mg (0.5 mg Intravenous Given 11/1/23 1646)   potassium chloride 10 mEq in 100 mL IVPB (10 mEq Intravenous New Bag 11/2/23 0124)       Current Discharge Medication List                  Scribe Attestation:   Scribe #1: I performed the above scribed service and the documentation accurately describes the services I performed. I attest to the accuracy of the note.     Attending:   Physician Attestation Statement for Scribe #1: I, Julio César Mayer Jr., MD, personally performed the services described in this documentation, as scribed by Johanna Davis, in my presence, and it is both accurate and complete.       Scribe Attestation:   Scribe #2: I performed the above scribed service and the documentation accurately describes the services I performed. I attest to the accuracy of the note.    Attending Attestation:           Physician Attestation for Scribe:    Physician Attestation Statement for Scribe #2: I, Robert Archer MD.,  reviewed documentation, as scribed by Amisha Catherine in my presence, and it is both accurate and complete. I also acknowledge and confirm the content of the note done by Scribe #1.           Clinical Impression       ICD-10-CM ICD-9-CM   1. Intractable right upper quadrant abdominal pain  R10.11 789.01   2. Right upper quadrant pain  R10.11 789.01   3. Acute cholecystitis  K81.0 575.0              Radhames Archer MD  11/02/23 3081

## 2023-11-02 ENCOUNTER — ANESTHESIA EVENT (OUTPATIENT)
Dept: SURGERY | Facility: HOSPITAL | Age: 68
DRG: 419 | End: 2023-11-02
Payer: MEDICARE

## 2023-11-02 ENCOUNTER — ANESTHESIA (OUTPATIENT)
Dept: SURGERY | Facility: HOSPITAL | Age: 68
DRG: 419 | End: 2023-11-02
Payer: MEDICARE

## 2023-11-02 LAB
ALBUMIN SERPL BCP-MCNC: 3.7 G/DL (ref 3.5–5.2)
ALP SERPL-CCNC: 67 U/L (ref 55–135)
ALT SERPL W/O P-5'-P-CCNC: 26 U/L (ref 10–44)
ANION GAP SERPL CALC-SCNC: 11 MMOL/L (ref 8–16)
AST SERPL-CCNC: 26 U/L (ref 10–40)
BILIRUB SERPL-MCNC: 1.3 MG/DL (ref 0.1–1)
BUN SERPL-MCNC: 11 MG/DL (ref 8–23)
CALCIUM SERPL-MCNC: 9.1 MG/DL (ref 8.7–10.5)
CHLORIDE SERPL-SCNC: 105 MMOL/L (ref 95–110)
CO2 SERPL-SCNC: 27 MMOL/L (ref 23–29)
CREAT SERPL-MCNC: 0.7 MG/DL (ref 0.5–1.4)
EST. GFR  (NO RACE VARIABLE): >60 ML/MIN/1.73 M^2
GLUCOSE SERPL-MCNC: 169 MG/DL (ref 70–110)
POTASSIUM SERPL-SCNC: 3.2 MMOL/L (ref 3.5–5.1)
PROT SERPL-MCNC: 6.5 G/DL (ref 6–8.4)
SODIUM SERPL-SCNC: 143 MMOL/L (ref 136–145)

## 2023-11-02 PROCEDURE — 63600175 PHARM REV CODE 636 W HCPCS: Performed by: STUDENT IN AN ORGANIZED HEALTH CARE EDUCATION/TRAINING PROGRAM

## 2023-11-02 PROCEDURE — 99285 EMERGENCY DEPT VISIT HI MDM: CPT

## 2023-11-02 PROCEDURE — 88304 TISSUE EXAM BY PATHOLOGIST: CPT | Performed by: PATHOLOGY

## 2023-11-02 PROCEDURE — 37000008 HC ANESTHESIA 1ST 15 MINUTES: Performed by: STUDENT IN AN ORGANIZED HEALTH CARE EDUCATION/TRAINING PROGRAM

## 2023-11-02 PROCEDURE — 36000710: Performed by: STUDENT IN AN ORGANIZED HEALTH CARE EDUCATION/TRAINING PROGRAM

## 2023-11-02 PROCEDURE — 71000039 HC RECOVERY, EACH ADD'L HOUR: Performed by: STUDENT IN AN ORGANIZED HEALTH CARE EDUCATION/TRAINING PROGRAM

## 2023-11-02 PROCEDURE — 94761 N-INVAS EAR/PLS OXIMETRY MLT: CPT

## 2023-11-02 PROCEDURE — 25000003 PHARM REV CODE 250: Performed by: STUDENT IN AN ORGANIZED HEALTH CARE EDUCATION/TRAINING PROGRAM

## 2023-11-02 PROCEDURE — 88304 TISSUE EXAM BY PATHOLOGIST: CPT | Mod: 26,,, | Performed by: PATHOLOGY

## 2023-11-02 PROCEDURE — 88304 PR  SURG PATH,LEVEL III: ICD-10-PCS | Mod: 26,,, | Performed by: PATHOLOGY

## 2023-11-02 PROCEDURE — 47562 PR LAP,CHOLECYSTECTOMY: ICD-10-PCS | Mod: ,,, | Performed by: STUDENT IN AN ORGANIZED HEALTH CARE EDUCATION/TRAINING PROGRAM

## 2023-11-02 PROCEDURE — 37000009 HC ANESTHESIA EA ADD 15 MINS: Performed by: STUDENT IN AN ORGANIZED HEALTH CARE EDUCATION/TRAINING PROGRAM

## 2023-11-02 PROCEDURE — 63600175 PHARM REV CODE 636 W HCPCS: Performed by: NURSE ANESTHETIST, CERTIFIED REGISTERED

## 2023-11-02 PROCEDURE — G0378 HOSPITAL OBSERVATION PER HR: HCPCS

## 2023-11-02 PROCEDURE — 25000003 PHARM REV CODE 250

## 2023-11-02 PROCEDURE — 25000003 PHARM REV CODE 250: Performed by: NURSE ANESTHETIST, CERTIFIED REGISTERED

## 2023-11-02 PROCEDURE — 99232 PR SUBSEQUENT HOSPITAL CARE,LEVL II: ICD-10-PCS | Mod: 57,,, | Performed by: STUDENT IN AN ORGANIZED HEALTH CARE EDUCATION/TRAINING PROGRAM

## 2023-11-02 PROCEDURE — 36000711: Performed by: STUDENT IN AN ORGANIZED HEALTH CARE EDUCATION/TRAINING PROGRAM

## 2023-11-02 PROCEDURE — 99232 SBSQ HOSP IP/OBS MODERATE 35: CPT | Mod: 57,,, | Performed by: STUDENT IN AN ORGANIZED HEALTH CARE EDUCATION/TRAINING PROGRAM

## 2023-11-02 PROCEDURE — 47562 LAPAROSCOPIC CHOLECYSTECTOMY: CPT | Mod: ,,, | Performed by: STUDENT IN AN ORGANIZED HEALTH CARE EDUCATION/TRAINING PROGRAM

## 2023-11-02 PROCEDURE — 27201423 OPTIME MED/SURG SUP & DEVICES STERILE SUPPLY: Performed by: STUDENT IN AN ORGANIZED HEALTH CARE EDUCATION/TRAINING PROGRAM

## 2023-11-02 PROCEDURE — 80053 COMPREHEN METABOLIC PANEL: CPT | Performed by: STUDENT IN AN ORGANIZED HEALTH CARE EDUCATION/TRAINING PROGRAM

## 2023-11-02 PROCEDURE — 27000221 HC OXYGEN, UP TO 24 HOURS

## 2023-11-02 PROCEDURE — 63600175 PHARM REV CODE 636 W HCPCS: Performed by: EMERGENCY MEDICINE

## 2023-11-02 PROCEDURE — 36415 COLL VENOUS BLD VENIPUNCTURE: CPT | Performed by: STUDENT IN AN ORGANIZED HEALTH CARE EDUCATION/TRAINING PROGRAM

## 2023-11-02 PROCEDURE — 71000033 HC RECOVERY, INTIAL HOUR: Performed by: STUDENT IN AN ORGANIZED HEALTH CARE EDUCATION/TRAINING PROGRAM

## 2023-11-02 RX ORDER — KETOROLAC TROMETHAMINE 10 MG/1
10 TABLET, FILM COATED ORAL EVERY 6 HOURS
Status: DISCONTINUED | OUTPATIENT
Start: 2023-11-02 | End: 2023-11-02

## 2023-11-02 RX ORDER — ONDANSETRON 2 MG/ML
INJECTION INTRAMUSCULAR; INTRAVENOUS
Status: DISCONTINUED | OUTPATIENT
Start: 2023-11-02 | End: 2023-11-02

## 2023-11-02 RX ORDER — PROMETHAZINE HYDROCHLORIDE 12.5 MG/1
12.5 TABLET ORAL EVERY 6 HOURS PRN
Status: DISCONTINUED | OUTPATIENT
Start: 2023-11-02 | End: 2023-11-05 | Stop reason: HOSPADM

## 2023-11-02 RX ORDER — FENTANYL CITRATE 50 UG/ML
INJECTION, SOLUTION INTRAMUSCULAR; INTRAVENOUS
Status: DISCONTINUED | OUTPATIENT
Start: 2023-11-02 | End: 2023-11-02

## 2023-11-02 RX ORDER — ROCURONIUM BROMIDE 10 MG/ML
INJECTION, SOLUTION INTRAVENOUS
Status: DISCONTINUED | OUTPATIENT
Start: 2023-11-02 | End: 2023-11-02

## 2023-11-02 RX ORDER — OXYCODONE HYDROCHLORIDE 5 MG/1
5 TABLET ORAL EVERY 6 HOURS PRN
Status: DISCONTINUED | OUTPATIENT
Start: 2023-11-02 | End: 2023-11-05 | Stop reason: HOSPADM

## 2023-11-02 RX ORDER — HYDROMORPHONE HYDROCHLORIDE 2 MG/ML
0.5 INJECTION, SOLUTION INTRAMUSCULAR; INTRAVENOUS; SUBCUTANEOUS EVERY 4 HOURS PRN
Status: DISCONTINUED | OUTPATIENT
Start: 2023-11-02 | End: 2023-11-02

## 2023-11-02 RX ORDER — METHOCARBAMOL 750 MG/1
750 TABLET, FILM COATED ORAL 4 TIMES DAILY
Status: DISCONTINUED | OUTPATIENT
Start: 2023-11-02 | End: 2023-11-05 | Stop reason: HOSPADM

## 2023-11-02 RX ORDER — PHENYLEPHRINE HYDROCHLORIDE 10 MG/ML
INJECTION INTRAVENOUS
Status: DISCONTINUED | OUTPATIENT
Start: 2023-11-02 | End: 2023-11-02

## 2023-11-02 RX ORDER — ONDANSETRON 2 MG/ML
4 INJECTION INTRAMUSCULAR; INTRAVENOUS EVERY 8 HOURS PRN
Status: DISCONTINUED | OUTPATIENT
Start: 2023-11-02 | End: 2023-11-05 | Stop reason: HOSPADM

## 2023-11-02 RX ORDER — LOSARTAN POTASSIUM 50 MG/1
50 TABLET ORAL DAILY
Status: DISCONTINUED | OUTPATIENT
Start: 2023-11-02 | End: 2023-11-02

## 2023-11-02 RX ORDER — KETOROLAC TROMETHAMINE 30 MG/ML
INJECTION, SOLUTION INTRAMUSCULAR; INTRAVENOUS
Status: DISCONTINUED | OUTPATIENT
Start: 2023-11-02 | End: 2023-11-02

## 2023-11-02 RX ORDER — INDOCYANINE GREEN AND WATER 25 MG
KIT INJECTION
Status: DISCONTINUED | OUTPATIENT
Start: 2023-11-02 | End: 2023-11-02

## 2023-11-02 RX ORDER — PROPOFOL 10 MG/ML
VIAL (ML) INTRAVENOUS
Status: DISCONTINUED | OUTPATIENT
Start: 2023-11-02 | End: 2023-11-02

## 2023-11-02 RX ORDER — LIDOCAINE HYDROCHLORIDE 10 MG/ML
INJECTION, SOLUTION EPIDURAL; INFILTRATION; INTRACAUDAL; PERINEURAL
Status: DISCONTINUED | OUTPATIENT
Start: 2023-11-02 | End: 2023-11-02

## 2023-11-02 RX ORDER — OXYCODONE AND ACETAMINOPHEN 5; 325 MG/1; MG/1
1 TABLET ORAL
Status: DISCONTINUED | OUTPATIENT
Start: 2023-11-02 | End: 2023-11-02 | Stop reason: HOSPADM

## 2023-11-02 RX ORDER — AMLODIPINE BESYLATE 10 MG/1
10 TABLET ORAL DAILY
Status: DISCONTINUED | OUTPATIENT
Start: 2023-11-02 | End: 2023-11-02

## 2023-11-02 RX ORDER — SODIUM CHLORIDE 0.9 % (FLUSH) 0.9 %
10 SYRINGE (ML) INJECTION
Status: DISCONTINUED | OUTPATIENT
Start: 2023-11-02 | End: 2023-11-05 | Stop reason: HOSPADM

## 2023-11-02 RX ORDER — SODIUM CHLORIDE, SODIUM LACTATE, POTASSIUM CHLORIDE, CALCIUM CHLORIDE 600; 310; 30; 20 MG/100ML; MG/100ML; MG/100ML; MG/100ML
INJECTION, SOLUTION INTRAVENOUS CONTINUOUS
Status: DISCONTINUED | OUTPATIENT
Start: 2023-11-02 | End: 2023-11-02

## 2023-11-02 RX ORDER — HYDROMORPHONE HYDROCHLORIDE 2 MG/ML
0.2 INJECTION, SOLUTION INTRAMUSCULAR; INTRAVENOUS; SUBCUTANEOUS EVERY 5 MIN PRN
Status: DISCONTINUED | OUTPATIENT
Start: 2023-11-02 | End: 2023-11-02 | Stop reason: HOSPADM

## 2023-11-02 RX ORDER — MORPHINE SULFATE 2 MG/ML
2 INJECTION, SOLUTION INTRAMUSCULAR; INTRAVENOUS
Status: DISCONTINUED | OUTPATIENT
Start: 2023-11-02 | End: 2023-11-05 | Stop reason: HOSPADM

## 2023-11-02 RX ORDER — BUPIVACAINE HYDROCHLORIDE 2.5 MG/ML
INJECTION, SOLUTION EPIDURAL; INFILTRATION; INTRACAUDAL
Status: DISCONTINUED | OUTPATIENT
Start: 2023-11-02 | End: 2023-11-02 | Stop reason: HOSPADM

## 2023-11-02 RX ORDER — HYDROMORPHONE HYDROCHLORIDE 2 MG/ML
0.2 INJECTION, SOLUTION INTRAMUSCULAR; INTRAVENOUS; SUBCUTANEOUS EVERY 6 HOURS PRN
Status: DISCONTINUED | OUTPATIENT
Start: 2023-11-02 | End: 2023-11-05 | Stop reason: HOSPADM

## 2023-11-02 RX ORDER — KETOROLAC TROMETHAMINE 30 MG/ML
15 INJECTION, SOLUTION INTRAMUSCULAR; INTRAVENOUS
Status: DISCONTINUED | OUTPATIENT
Start: 2023-11-02 | End: 2023-11-05 | Stop reason: HOSPADM

## 2023-11-02 RX ORDER — MIDAZOLAM HYDROCHLORIDE 1 MG/ML
INJECTION, SOLUTION INTRAMUSCULAR; INTRAVENOUS
Status: DISCONTINUED | OUTPATIENT
Start: 2023-11-02 | End: 2023-11-02

## 2023-11-02 RX ORDER — ONDANSETRON 2 MG/ML
4 INJECTION INTRAMUSCULAR; INTRAVENOUS DAILY PRN
Status: DISCONTINUED | OUTPATIENT
Start: 2023-11-02 | End: 2023-11-02 | Stop reason: HOSPADM

## 2023-11-02 RX ORDER — ACETAMINOPHEN 325 MG/1
650 TABLET ORAL
Status: DISCONTINUED | OUTPATIENT
Start: 2023-11-02 | End: 2023-11-05 | Stop reason: HOSPADM

## 2023-11-02 RX ADMIN — ACETAMINOPHEN 650 MG: 325 TABLET ORAL at 04:11

## 2023-11-02 RX ADMIN — ROCURONIUM BROMIDE 40 MG: 10 INJECTION, SOLUTION INTRAVENOUS at 12:11

## 2023-11-02 RX ADMIN — METHOCARBAMOL 750 MG: 750 TABLET ORAL at 04:11

## 2023-11-02 RX ADMIN — ONDANSETRON 4 MG: 2 INJECTION INTRAMUSCULAR; INTRAVENOUS at 01:11

## 2023-11-02 RX ADMIN — KETOROLAC TROMETHAMINE 15 MG: 30 INJECTION, SOLUTION INTRAMUSCULAR; INTRAVENOUS at 08:11

## 2023-11-02 RX ADMIN — SUGAMMADEX 200 MG: 100 INJECTION, SOLUTION INTRAVENOUS at 01:11

## 2023-11-02 RX ADMIN — PHENYLEPHRINE HYDROCHLORIDE 100 MCG: 10 INJECTION INTRAVENOUS at 12:11

## 2023-11-02 RX ADMIN — LIDOCAINE HYDROCHLORIDE 50 MG: 10 SOLUTION INTRAVENOUS at 12:11

## 2023-11-02 RX ADMIN — MIDAZOLAM 2 MG: 1 INJECTION INTRAMUSCULAR; INTRAVENOUS at 12:11

## 2023-11-02 RX ADMIN — POTASSIUM CHLORIDE 10 MEQ: 7.46 INJECTION, SOLUTION INTRAVENOUS at 12:11

## 2023-11-02 RX ADMIN — INDOCYANINE GREEN AND WATER 2.5 MG: KIT at 12:11

## 2023-11-02 RX ADMIN — PROPOFOL 100 MG: 10 INJECTION, EMULSION INTRAVENOUS at 12:11

## 2023-11-02 RX ADMIN — PROMETHAZINE HYDROCHLORIDE 12.5 MG: 12.5 TABLET ORAL at 05:11

## 2023-11-02 RX ADMIN — PIPERACILLIN AND TAZOBACTAM 4.5 G: 4; .5 INJECTION, POWDER, LYOPHILIZED, FOR SOLUTION INTRAVENOUS; PARENTERAL at 03:11

## 2023-11-02 RX ADMIN — ROCURONIUM BROMIDE 10 MG: 10 INJECTION, SOLUTION INTRAVENOUS at 12:11

## 2023-11-02 RX ADMIN — METHOCARBAMOL 750 MG: 750 TABLET ORAL at 08:11

## 2023-11-02 RX ADMIN — FENTANYL CITRATE 25 MCG: 50 INJECTION, SOLUTION INTRAMUSCULAR; INTRAVENOUS at 12:11

## 2023-11-02 RX ADMIN — PIPERACILLIN AND TAZOBACTAM 4.5 G: 4; .5 INJECTION, POWDER, LYOPHILIZED, FOR SOLUTION INTRAVENOUS; PARENTERAL at 12:11

## 2023-11-02 RX ADMIN — POTASSIUM CHLORIDE 10 MEQ: 7.46 INJECTION, SOLUTION INTRAVENOUS at 01:11

## 2023-11-02 RX ADMIN — HYDROMORPHONE HYDROCHLORIDE 0.2 MG: 2 INJECTION INTRAMUSCULAR; INTRAVENOUS; SUBCUTANEOUS at 09:11

## 2023-11-02 RX ADMIN — HYDROMORPHONE HYDROCHLORIDE 0.5 MG: 2 INJECTION INTRAMUSCULAR; INTRAVENOUS; SUBCUTANEOUS at 06:11

## 2023-11-02 RX ADMIN — SODIUM CHLORIDE, SODIUM LACTATE, POTASSIUM CHLORIDE, AND CALCIUM CHLORIDE: .6; .31; .03; .02 INJECTION, SOLUTION INTRAVENOUS at 12:11

## 2023-11-02 RX ADMIN — KETOROLAC TROMETHAMINE 30 MG: 30 INJECTION, SOLUTION INTRAMUSCULAR; INTRAVENOUS at 01:11

## 2023-11-02 RX ADMIN — PHENYLEPHRINE HYDROCHLORIDE 200 MCG: 10 INJECTION INTRAVENOUS at 12:11

## 2023-11-02 NOTE — PLAN OF CARE
Pt resting well after surgery . Lap sites are covered with derma bond . Pt easily arousable . Safety measures are intact . Family at bedside . Iv intact . Vitals are stable . Chart check complete. Will continue to monitor .

## 2023-11-02 NOTE — ANESTHESIA PROCEDURE NOTES
Intubation    Date/Time: 11/2/2023 12:17 PM    Performed by: Iveth Diallo CRNA  Authorized by: Anmol Blakely MD    Intubation:     Induction:  Intravenous    Intubated:  Postinduction    Mask Ventilation:  Easy mask    Attempts:  1    Attempted By:  CRNA    Method of Intubation:  Direct    Blade:  Ary 3    Laryngeal View Grade: Grade IIA - cords partially seen      Difficult Airway Encountered?: No      Complications:  None    Airway Device:  Oral endotracheal tube    Airway Device Size:  7.5    Style/Cuff Inflation:  Cuffed (inflated to minimal occlusive pressure)    Tube secured:  21    Secured at:  The lips    Placement Verified By:  Capnometry and Revisualization with laryngoscopy    Complicating Factors:  Anterior larynx and overbite    Findings Post-Intubation:  BS equal bilateral and atraumatic/condition of teeth unchanged

## 2023-11-02 NOTE — PLAN OF CARE
Patient remained free of any injury throughout shift. VSS. Pain control. PRN antiemetics. Potassium replacements. LR @ 100mL/hr. Plan for surgery in AM. Call light in reach and side rails x2. Will continue with plan of care.     Problem: Adult Inpatient Plan of Care  Goal: Absence of Hospital-Acquired Illness or Injury  Outcome: Ongoing, Progressing     Problem: Adult Inpatient Plan of Care  Goal: Optimal Comfort and Wellbeing  Outcome: Ongoing, Progressing

## 2023-11-02 NOTE — PLAN OF CARE
[facility] - Med Surg  Initial Discharge Assessment       Primary Care Provider: Hugh Middleton MD    Admission Diagnosis: Acute cholecystitis [K81.0]  Right upper quadrant pain [R10.11]  Intractable right upper quadrant abdominal pain [R10.11]    Admission Date: 11/1/2023  Expected Discharge Date: per attending         Payor: BLUE CROSS BLUE SHIELD / Plan: BCBS BLUE SAVER PPO - HD / Product Type: PPO /     Extended Emergency Contact Information  Primary Emergency Contact: Luis Angel Pastrana  Address: [address] [place] [address]           [place] [address] [place]  Work Phone: [phone]  Mobile Phone: [phone]  Relation: Spouse    Discharge Plan A: Home with family         [id number] [place] - [address] [place]  [address]  [place] [address]  Phone: [phone] Fax: [phone]      Initial Assessment (most recent)       Adult Discharge Assessment - 11/02/23 1038          Discharge Assessment    Assessment Type Discharge Planning Assessment     Confirmed/corrected address, phone number and insurance Yes     Confirmed Demographics Correct on Facesheet     Source of Information patient     When was your last doctors appointment? 10/27/23     Communicated JORGE with patient/caregiver Date not available/Unable to determine     Reason For Admission acute cholecystitis     People in Home spouse     Do you expect to return to your current living situation? Yes     Do you have help at home or someone to help you manage your care at home? Yes     Who are your caregiver(s) and their phone number(s)? spouse     Prior to hospitilization cognitive status: Alert/Oriented     Current cognitive status: Alert/Oriented     Equipment Currently Used at Home none     Readmission within 30 days? No     Patient currently being followed by outpatient case management? No     Do you currently have service(s) that help you manage your care at home? No     Do you take prescription medications?  Yes     Do you have prescription coverage? Yes     Coverage bcbs     Do you have any problems affording any of your prescribed medications? No     Is the patient taking medications as prescribed? yes     Who is going to help you get home at discharge? spouse     How do you get to doctors appointments? family or friend will provide;car, drives self     Are you on dialysis? No     Do you take coumadin? No     Discharge Plan A Home with family

## 2023-11-02 NOTE — TRANSFER OF CARE
"Anesthesia Transfer of Care Note    Patient: Jemma Pastrana    Procedure(s) Performed: Procedure(s) (LRB):  XI ROBOTIC CHOLECYSTECTOMY (N/A)    Patient location: PACU    Anesthesia Type: general    Transport from OR: Transported from OR on room air with adequate spontaneous ventilation    Post pain: adequate analgesia    Post assessment: no apparent anesthetic complications    Post vital signs: stable    Level of consciousness: sedated    Nausea/Vomiting: no nausea/vomiting    Complications: none    Transfer of care protocol was followed      Last vitals:   Visit Vitals  /61 (BP Location: Right arm, Patient Position: Lying)   Pulse 77   Temp 37.2 °C (99 °F) (Skin)   Resp (!) 25   Ht 5' 7" (1.702 m)   Wt 70.5 kg (155 lb 5 oz)   SpO2 96%   Breastfeeding No   BMI 24.33 kg/m²     "

## 2023-11-02 NOTE — ANESTHESIA POSTPROCEDURE EVALUATION
Anesthesia Post Evaluation    Patient: Jemma Pastrana    Procedure(s) Performed: Procedure(s) (LRB):  XI ROBOTIC CHOLECYSTECTOMY (N/A)    Final Anesthesia Type: general      Patient location during evaluation: PACU  Patient participation: Yes- Able to Participate  Level of consciousness: awake and alert and oriented  Post-procedure vital signs: reviewed and stable  Pain management: adequate  Airway patency: patent  MADONNA mitigation strategies: Verification of full reversal of neuromuscular block  PONV status at discharge: No PONV  Anesthetic complications: no      Cardiovascular status: blood pressure returned to baseline and hemodynamically stable  Respiratory status: unassisted  Hydration status: euvolemic  Follow-up not needed.          Vitals Value Taken Time   /59 11/02/23 1554   Temp 37.7 °C (99.8 °F) 11/02/23 1554   Pulse 71 11/02/23 1554   Resp 16 11/02/23 1554   SpO2 95 % 11/02/23 1632         Event Time   Out of Recovery 11/02/2023 14:33:07         Pain/Alex Score: Pain Rating Prior to Med Admin: 8 (11/2/2023  4:50 PM)  Pain Rating Post Med Admin: 0 (11/2/2023  6:39 AM)  Alex Score: 8 (11/2/2023  2:40 PM)

## 2023-11-02 NOTE — OP NOTE
[facility] - Surgery ([facility])  General Surgery  Operative Note    SUMMARY     Date of Procedure: 11/2/2023     Procedure: Procedure(s) (LRB):  XI ROBOTIC CHOLECYSTECTOMY (N/A)     Surgeon(s) and Role:     * Missy Harrison MD - Primary    Assisting Surgeon: None    Pre-Operative Diagnosis: Acute cholecystitis [K81.0]    Post-Operative Diagnosis: Post-Op Diagnosis Codes:     * Acute cholecystitis [K81.0]    Anesthesia: General    Operative Findings (including complications, if any): distended, edematous gallbladder with stones at neck    Description of Technical Procedures:     Significant Surgical Tasks Conducted by the Assistant(s), if Applicable:  Patient was identified in the preoperative holding area.  After informed consent was obtained the patient was taken to the operating room and placed on the operating table in the supine position.  General anesthesia was administered.  The patient was prepped and draped in the usual sterile fashion and a time-out was performed indicating the correct patient procedure and operative site.  All in the room were in agreement.  Preoperative antibiotics were appropriate.     A Veress needle was used to access at the umbilicus. Because of an umbilical hernia pneumoperitoneum was difficult to achieve. The abdomen was accessed at flores's point with a Veress. Pneumoperitoneum was achieved without any compromise to cardiorespiratory status.  The abdomen was accessed with Optiview technique under direct visualization.  There was no evidence of underlying iatrogenic injury.  Three additional 8 mm robotic trocars were placed under direct visualization.  Upon inspection of the gall bladder it was noted to be very distended and edematous. There was stone impaction at the neck. The gallbladder was retracted anterior and superiorly over the liver and using hook electrocautery the lower 3rd of the gallbladder was mobilized off the liver.  Using indocyanine green and firefly technology  the cystic duct and common bile duct were identified.  The cystic duct and artery were dissected and the critical view of safety was achieved.  The cystic duct and artery were doubly clipped and ligated and the gallbladder was removed off the liver fossa using electrocautery.  Hemostasis was achieved on the liver bed.  The cystic duct stump was inspected with no evidence of leakage of bile and there was no pulsatile bleeding. The specimen was placed into an Endo-Catch bag and removed from the abdomen and sent off the field for pathology.  Using a suture Passer the fascial defect at the umbilicus was reapproximated using 0 Vicryl.  The robotic trocars removed and the abdomen was desufflated.  The skin was then closed with subcuticular Monocryl and Dermabond was applied.  The patient tolerated the procedure well and was awakened from anesthesia in good condition.      Estimated Blood Loss (EBL): 25 mL           Implants: * No implants in log *    Specimens:   Specimen (24h ago, onward)       Start     Ordered    11/02/23 1246  Specimen to Pathology, Surgery General Surgery  Once        Comments: Pre-op Diagnosis: Acute cholecystitis [K81.0]Procedure(s):XI ROBOTIC CHOLECYSTECTOMY Number of specimens: 1Name of specimens: 1. Gallbladder-perm     References:    Click here for ordering Quick Tip   Question Answer Comment   Procedure Type: General Surgery    Specimen Class: Routine/Screening    Which provider would you like to cc? LYRIC BROWN    Release to patient Immediate        11/02/23 1306                     Condition: Good    Malignancy: Procedure performed for malignancy no    Disposition: PACU - hemodynamically stable.    Attestation: I performed the procedure.

## 2023-11-02 NOTE — PROGRESS NOTES
[facility]  Col[facility] Surgery  Progress Note    Patient Name: Jemma Pastrana  MRN: [id number]  Admission Date: 11/1/2023  Hospital Length of Stay: 0 days  Attending Physician: Missy Harrison MD    Subjective:     Interval History: reports continued opain with nausea. Has been NPO    Post-Op Info:  Procedure(s) (LRB):  ROBOTIC CHOLECYSTECTOMY (N/A)   Day of Surgery      Medications:  Continuous Infusions:   lactated ringers 100 mL/hr at 11/01/23 2311     Scheduled Meds:   piperacillin-tazobactam (Zosyn) IV (PEDS and ADULTS) (extended infusion is not appropriate)  4.5 g Intravenous Q8H     PRN Meds:   HYDROmorphone    HYDROmorphone    ondansetron    promethazine    sodium chloride 0.9%        Objective:     Vital Signs (Most Recent):  Temp: 98.3 °F (36.8 °C) (11/02/23 0750)  Pulse: 106 (11/02/23 0750)  Resp: 14 (11/02/23 0750)  BP: (!) 149/62 (11/02/23 0750)  SpO2: (!) 83 % (11/02/23 0750) Vital Signs (24h Range):  Temp:  [98.2 °F (36.8 °C)-99.2 °F (37.3 °C)] 98.3 °F (36.8 °C)  Pulse:  [] 106  Resp:  [14-18] 14  SpO2:  [83 %-99 %] 83 %  BP: (148-168)/(62-78) 149/62     Intake/Output - Last 3 Shifts         10/31 0700  11/01 0659 11/01 0700  11/02 0659 11/02 0700  11/03 0659    P.O.  0     IV Piggyback  1389.2     Total Intake(mL/kg)  1389.2 (19.7)     Urine (mL/kg/hr)  0     Emesis/NG output  0     Other  0     Stool  0     Blood  0     Total Output  0     Net  +1389.2            Urine Occurrence  3 x     Stool Occurrence  0 x     Emesis Occurrence  0 x             Physical Exam  Constitutional:       Appearance: She is well-developed.   HENT:      Head: Normocephalic and atraumatic.   Eyes:      Conjunctiva/sclera: Conjunctivae normal.      Pupils: Pupils are equal, round, and reactive to light.   Neck:      Thyroid: No thyromegaly.   Cardiovascular:      Rate and Rhythm: Normal rate and regular rhythm.   Pulmonary:      Effort: Pulmonary effort is normal. No respiratory distress.   Abdominal:       General: There is no distension.      Palpations: Abdomen is soft. There is no mass.      Tenderness: There is abdominal tenderness.   Musculoskeletal:         General: No tenderness. Normal range of motion.      Cervical back: Normal range of motion.   Skin:     General: Skin is warm and dry.      Capillary Refill: Capillary refill takes less than 2 seconds.   Neurological:      General: No focal deficit present.      Mental Status: She is alert and oriented to person, place, and time.         Significant Labs:  CMP:   Recent Labs   Lab 11/02/23  0012   *   CALCIUM 9.1   ALBUMIN 3.7   PROT 6.5      K 3.2*   CO2 27      BUN 11   CREATININE 0.7   ALKPHOS 67   ALT 26   AST 26   BILITOT 1.3*       Significant Diagnostics:  U/S: I have reviewed all pertinent results/findings within the past 24 hours:  cholelithiasis    Assessment/Plan:     Active Diagnoses:    Diagnosis Date Noted POA    PRINCIPAL PROBLEM:  Acute cholecystitis [K81.0] 11/01/2023 Unknown      Problems Resolved During this Admission:       - to OR for cholecystectomy today  - r/b/a discussed. She agreed to proceed  - stop zosyn post op  - advance diet post op  - all questions answered    Missy Harrison MD  Colorectal Surgery  [facility]

## 2023-11-02 NOTE — ANESTHESIA PREPROCEDURE EVALUATION
2023  Jemma Pastrana is a 68 y.o., female     Patient Active Problem List   Diagnosis    Malaise    Recurrent sinusitis    Nasal congestion    Dizziness of unknown etiology    Nausea    Nonintractable episodic headache    Hypertension    Cyclic neutropenia    Acute cholecystitis     Past Medical History:   Diagnosis Date    Depression     Hypertension     Osteoporosis     Recurrent upper respiratory infection (URI)     Urolithiasis      Past Surgical History:   Procedure Laterality Date    ADENOIDECTOMY      BLADDER SUSPENSION       SECTION, LOW TRANSVERSE      x 1    HYSTERECTOMY      OOPHORECTOMY      bilateral with hysterectomy, benign     Robotic assisted laparoscopic anterior colposacropexy      TONSILLECTOMY      Umbilical Hernia Closure         Chemistry        Component Value Date/Time     2023 0012    K 3.2 (L) 2023 0012     2023 0012    CO2 27 2023 0012    BUN 11 2023 0012    CREATININE 0.7 2023 0012     (H) 2023 0012        Component Value Date/Time    CALCIUM 9.1 2023 0012    ALKPHOS 67 2023 0012    AST 26 2023 0012    ALT 26 2023 0012    BILITOT 1.3 (H) 2023 0012    ESTGFRAFRICA >60 2019 0839    EGFRNONAA >60 2019 0839        Lab Results   Component Value Date    WBC 8.85 2023    HGB 13.8 2023    HCT 40.1 2023    MCV 94 2023     2023       Pre-op Assessment    I have reviewed the Patient Summary Reports.    I have reviewed the NPO Status.   I have reviewed the Medications.     Review of Systems  Anesthesia Hx:  No problems with previous Anesthesia  History of prior surgery of interest to airway management or planning: Previous anesthesia: General  Denies Personal Hx of Anesthesia complications.   Social:  Non-Smoker     Hematology/Oncology:  Hematology Normal   Oncology Normal     Cardiovascular:   Hypertension    Pulmonary:  Pulmonary Normal  Denies Recent URI.    Renal/:  Renal/ Normal     Musculoskeletal:  Musculoskeletal Normal    Neurological:   Headaches    Endocrine:  Endocrine Normal    Psych:   depression          Physical Exam  General: Well nourished, Cooperative, Alert and Oriented    Airway:  Mallampati: II   Mouth Opening: Normal  TM Distance: Normal  Tongue: Normal  Neck ROM: Normal ROM    Dental:  Intact        Anesthesia Plan  Type of Anesthesia, risks & benefits discussed:    Anesthesia Type: Gen ETT  Intra-op Monitoring Plan: Standard ASA Monitors  Post Op Pain Control Plan: multimodal analgesia and IV/PO Opioids PRN  Induction:  IV  Airway Plan: Direct, Post-Induction  Informed Consent: Informed consent signed with the Patient and all parties understand the risks and agree with anesthesia plan.  All questions answered.   ASA Score: 2  Day of Surgery Review of History & Physical: H&P Update referred to the surgeon/provider.    Ready For Surgery From Anesthesia Perspective.     .

## 2023-11-03 LAB
ALBUMIN SERPL BCP-MCNC: 3.2 G/DL (ref 3.5–5.2)
ALP SERPL-CCNC: 76 U/L (ref 55–135)
ALT SERPL W/O P-5'-P-CCNC: 132 U/L (ref 10–44)
ANION GAP SERPL CALC-SCNC: 10 MMOL/L (ref 8–16)
AST SERPL-CCNC: 123 U/L (ref 10–40)
BILIRUB SERPL-MCNC: 1.7 MG/DL (ref 0.1–1)
BUN SERPL-MCNC: 24 MG/DL (ref 8–23)
CALCIUM SERPL-MCNC: 9.2 MG/DL (ref 8.7–10.5)
CHLORIDE SERPL-SCNC: 101 MMOL/L (ref 95–110)
CO2 SERPL-SCNC: 28 MMOL/L (ref 23–29)
CREAT SERPL-MCNC: 0.8 MG/DL (ref 0.5–1.4)
ERYTHROCYTE [DISTWIDTH] IN BLOOD BY AUTOMATED COUNT: 12.4 % (ref 11.5–14.5)
EST. GFR  (NO RACE VARIABLE): >60 ML/MIN/1.73 M^2
GLUCOSE SERPL-MCNC: 120 MG/DL (ref 70–110)
HCT VFR BLD AUTO: 36 % (ref 37–48.5)
HGB BLD-MCNC: 12.1 G/DL (ref 12–16)
MCH RBC QN AUTO: 31.4 PG (ref 27–31)
MCHC RBC AUTO-ENTMCNC: 33.6 G/DL (ref 32–36)
MCV RBC AUTO: 94 FL (ref 82–98)
PLATELET # BLD AUTO: 196 K/UL (ref 150–450)
PMV BLD AUTO: 10.6 FL (ref 9.2–12.9)
POTASSIUM SERPL-SCNC: 3 MMOL/L (ref 3.5–5.1)
PROT SERPL-MCNC: 5.9 G/DL (ref 6–8.4)
RBC # BLD AUTO: 3.85 M/UL (ref 4–5.4)
SODIUM SERPL-SCNC: 139 MMOL/L (ref 136–145)
WBC # BLD AUTO: 23.06 K/UL (ref 3.9–12.7)

## 2023-11-03 PROCEDURE — 11000001 HC ACUTE MED/SURG PRIVATE ROOM

## 2023-11-03 PROCEDURE — 25500020 PHARM REV CODE 255: Performed by: STUDENT IN AN ORGANIZED HEALTH CARE EDUCATION/TRAINING PROGRAM

## 2023-11-03 PROCEDURE — 25000003 PHARM REV CODE 250: Performed by: SURGERY

## 2023-11-03 PROCEDURE — 27000221 HC OXYGEN, UP TO 24 HOURS

## 2023-11-03 PROCEDURE — 80053 COMPREHEN METABOLIC PANEL: CPT | Performed by: STUDENT IN AN ORGANIZED HEALTH CARE EDUCATION/TRAINING PROGRAM

## 2023-11-03 PROCEDURE — 85027 COMPLETE CBC AUTOMATED: CPT | Performed by: STUDENT IN AN ORGANIZED HEALTH CARE EDUCATION/TRAINING PROGRAM

## 2023-11-03 PROCEDURE — 36415 COLL VENOUS BLD VENIPUNCTURE: CPT | Performed by: STUDENT IN AN ORGANIZED HEALTH CARE EDUCATION/TRAINING PROGRAM

## 2023-11-03 PROCEDURE — 25000003 PHARM REV CODE 250

## 2023-11-03 PROCEDURE — 94761 N-INVAS EAR/PLS OXIMETRY MLT: CPT

## 2023-11-03 PROCEDURE — A9585 GADOBUTROL INJECTION: HCPCS | Performed by: STUDENT IN AN ORGANIZED HEALTH CARE EDUCATION/TRAINING PROGRAM

## 2023-11-03 PROCEDURE — 63600175 PHARM REV CODE 636 W HCPCS: Performed by: STUDENT IN AN ORGANIZED HEALTH CARE EDUCATION/TRAINING PROGRAM

## 2023-11-03 PROCEDURE — 99024 PR POST-OP FOLLOW-UP VISIT: ICD-10-PCS | Mod: ,,,

## 2023-11-03 PROCEDURE — 99900035 HC TECH TIME PER 15 MIN (STAT)

## 2023-11-03 PROCEDURE — 99024 POSTOP FOLLOW-UP VISIT: CPT | Mod: ,,,

## 2023-11-03 RX ORDER — GADOBUTROL 604.72 MG/ML
10 INJECTION INTRAVENOUS
Status: COMPLETED | OUTPATIENT
Start: 2023-11-03 | End: 2023-11-03

## 2023-11-03 RX ORDER — LORAZEPAM 1 MG/1
1 TABLET ORAL ONCE
Status: DISCONTINUED | OUTPATIENT
Start: 2023-11-03 | End: 2023-11-05 | Stop reason: HOSPADM

## 2023-11-03 RX ORDER — DIAZEPAM 5 MG/1
10 TABLET ORAL ONCE
Status: COMPLETED | OUTPATIENT
Start: 2023-11-03 | End: 2023-11-03

## 2023-11-03 RX ADMIN — HYDROMORPHONE HYDROCHLORIDE 0.2 MG: 2 INJECTION INTRAMUSCULAR; INTRAVENOUS; SUBCUTANEOUS at 03:11

## 2023-11-03 RX ADMIN — GADOBUTROL 7 ML: 604.72 INJECTION INTRAVENOUS at 08:11

## 2023-11-03 RX ADMIN — OXYCODONE HYDROCHLORIDE 5 MG: 5 TABLET ORAL at 09:11

## 2023-11-03 RX ADMIN — PROMETHAZINE HYDROCHLORIDE 12.5 MG: 12.5 TABLET ORAL at 09:11

## 2023-11-03 RX ADMIN — METHOCARBAMOL 750 MG: 750 TABLET ORAL at 09:11

## 2023-11-03 RX ADMIN — DIAZEPAM 10 MG: 5 TABLET ORAL at 07:11

## 2023-11-03 RX ADMIN — KETOROLAC TROMETHAMINE 15 MG: 30 INJECTION, SOLUTION INTRAMUSCULAR; INTRAVENOUS at 09:11

## 2023-11-03 NOTE — HPI
Pt is a 69yo F with known biliary colic who presents with 24h intractable RUQ pain. Reports sharp and worsened with fatty foods. Has associated chills and nausea. Denies fevers or vomiting. Pain had some relief with several doses of narcotic pain medications.  Workup in ED included - Significant labs: WBC 6, Hb 14, K 2.9, Tbili 1.6. US; stones and sludge impacted in neck of gallbladder; CBD 4mm. Pt admitted to general surgery for acute cholecystitis.

## 2023-11-03 NOTE — PROGRESS NOTES
[facility] Surg  General Surgery  Progress Note    Subjective:     History of Present Illness:  Pt is a 67yo F with known biliary colic who presents with 24h intractable RUQ pain. Reports sharp and worsened with fatty foods. Has associated chills and nausea. Denies fevers or vomiting. Pain had some relief with several doses of narcotic pain medications.  Workup in ED included - Significant labs: WBC 6, Hb 14, K 2.9, Tbili 1.6. US; stones and sludge impacted in neck of gallbladder; CBD 4mm. Pt admitted to general surgery for acute cholecystitis.         Post-Op Info:  Procedure(s) (LRB):  XI ROBOTIC CHOLECYSTECTOMY (N/A)   1 Day Post-Op     Interval History: POD 1: pt with improved abdominal pain. Tolerating diet without n/v. Tbili elevated post op    Medications:  Continuous Infusions:  Scheduled Meds:   acetaminophen  650 mg Oral Q6H    ketorolac  15 mg Intravenous Q6H    LORazepam  1 mg Oral Once    methocarbamoL  750 mg Oral QID     PRN Meds:HYDROmorphone, morphine, ondansetron, oxyCODONE, promethazine, sodium chloride 0.9%     Review of patient's allergies indicates:   Allergen Reactions    Procardia [nifedipine] Shortness Of Breath     Objective:     Vital Signs (Most Recent):  Temp: 98.3 °F (36.8 °C) (11/03/23 0827)  Pulse: 79 (11/03/23 0827)  Resp: 15 (11/03/23 0827)  BP: 134/62 (11/03/23 0827)  SpO2: (!) 93 % (11/03/23 0827) Vital Signs (24h Range):  Temp:  [98.3 °F (36.8 °C)-99.8 °F (37.7 °C)] 98.3 °F (36.8 °C)  Pulse:  [71-97] 79  Resp:  [15-25] 15  SpO2:  [91 %-100 %] 93 %  BP: (115-138)/(54-65) 134/62     Weight: 70.5 kg (155 lb 5 oz)  Body mass index is 24.33 kg/m².    Intake/Output - Last 3 Shifts         11/01 0700  11/02 0659 11/02 0700  11/03 0659 11/03 0700  11/04 0659    P.O. 0 240     IV Piggyback 1389.2 800     Total Intake(mL/kg) 1389.2 (19.7) 1040 (14.8)     Urine (mL/kg/hr) 0      Emesis/NG output 0      Other 0      Stool 0      Blood 0 25     Total Output 0 25     Net +1389.2 +1015             Urine Occurrence 3 x 6 x     Stool Occurrence 0 x      Emesis Occurrence 0 x               Physical Exam  Vitals and nursing note reviewed.   Constitutional:       General: She is not in acute distress.     Appearance: Normal appearance. She is well-developed and normal weight. She is not ill-appearing or toxic-appearing.   HENT:      Head: Normocephalic and atraumatic.      Right Ear: External ear normal.      Left Ear: External ear normal.      Nose: Nose normal.   Cardiovascular:      Rate and Rhythm: Normal rate.   Pulmonary:      Effort: Pulmonary effort is normal. No respiratory distress.   Abdominal:      Comments: Soft, non-distended, appropriate post op tenderness. Incisions c/d/I without SOI   Musculoskeletal:         General: Normal range of motion.      Cervical back: Normal range of motion and neck supple.   Skin:     General: Skin is warm and dry.   Neurological:      Mental Status: She is alert and oriented to person, place, and time.   Psychiatric:         Mood and Affect: Mood normal.         Behavior: Behavior normal.          Significant Labs:  I have reviewed all pertinent lab results within the past 24 hours.  CBC:   Recent Labs   Lab 11/03/23  0603   WBC 23.06*   RBC 3.85*   HGB 12.1   HCT 36.0*      MCV 94   MCH 31.4*   MCHC 33.6     BMP:   Recent Labs   Lab 11/03/23  0603   *      K 3.0*      CO2 28   BUN 24*   CREATININE 0.8   CALCIUM 9.2     CMP:   Recent Labs   Lab 11/03/23  0603   *   CALCIUM 9.2   ALBUMIN 3.2*   PROT 5.9*      K 3.0*   CO2 28      BUN 24*   CREATININE 0.8   ALKPHOS 76   *   *   BILITOT 1.7*       Significant Diagnostics:  I have reviewed all pertinent imaging results/findings within the past 24 hours.    Assessment/Plan:     * Acute cholecystitis  68 y.o. female with acute cholecystitis s/p robotic cholecystectomy 11/2/23    -Tbili elevated post op; obtain MRCP today. If retained stone/sludge may need  ERCP  -continue multimodal pain control  -encouraged OOB ambulation        Con Johnson PA-C  General Surgery  [facility]

## 2023-11-03 NOTE — ASSESSMENT & PLAN NOTE
68 y.o. female with acute cholecystitis s/p robotic cholecystectomy 11/2/23    -Tbili elevated post op; obtain MRCP today. If retained stone/sludge may need ERCP  -continue multimodal pain control  -encouraged OOB ambulation

## 2023-11-03 NOTE — HOSPITAL COURSE
POD 1: pt with improved abdominal pain. Tolerating diet without n/v. Tbili elevated post op    Postop day 2.  Improved abdominal pain.  Tolerated diet.  Liver function tests show improvement.  MRCP raise the question of sludge in the common bile duct.    Postop day 3.  Tolerated a diet with a small amount of nausea.  Has some right flank pain.  Afebrile.  White blood cell count normal.  Liver function tests show improvement.  Will plan discharge home

## 2023-11-03 NOTE — SUBJECTIVE & OBJECTIVE
Interval History: POD 1: pt with improved abdominal pain. Tolerating diet without n/v. Tbili elevated post op    Medications:  Continuous Infusions:  Scheduled Meds:   acetaminophen  650 mg Oral Q6H    ketorolac  15 mg Intravenous Q6H    LORazepam  1 mg Oral Once    methocarbamoL  750 mg Oral QID     PRN Meds:HYDROmorphone, morphine, ondansetron, oxyCODONE, promethazine, sodium chloride 0.9%     Review of patient's allergies indicates:   Allergen Reactions    Procardia [nifedipine] Shortness Of Breath     Objective:     Vital Signs (Most Recent):  Temp: 98.3 °F (36.8 °C) (11/03/23 0827)  Pulse: 79 (11/03/23 0827)  Resp: 15 (11/03/23 0827)  BP: 134/62 (11/03/23 0827)  SpO2: (!) 93 % (11/03/23 0827) Vital Signs (24h Range):  Temp:  [98.3 °F (36.8 °C)-99.8 °F (37.7 °C)] 98.3 °F (36.8 °C)  Pulse:  [71-97] 79  Resp:  [15-25] 15  SpO2:  [91 %-100 %] 93 %  BP: (115-138)/(54-65) 134/62     Weight: 70.5 kg (155 lb 5 oz)  Body mass index is 24.33 kg/m².    Intake/Output - Last 3 Shifts         11/01 0700  11/02 0659 11/02 0700  11/03 0659 11/03 0700  11/04 0659    P.O. 0 240     IV Piggyback 1389.2 800     Total Intake(mL/kg) 1389.2 (19.7) 1040 (14.8)     Urine (mL/kg/hr) 0      Emesis/NG output 0      Other 0      Stool 0      Blood 0 25     Total Output 0 25     Net +1389.2 +1015            Urine Occurrence 3 x 6 x     Stool Occurrence 0 x      Emesis Occurrence 0 x               Physical Exam  Vitals and nursing note reviewed.   Constitutional:       General: She is not in acute distress.     Appearance: Normal appearance. She is well-developed and normal weight. She is not ill-appearing or toxic-appearing.   HENT:      Head: Normocephalic and atraumatic.      Right Ear: External ear normal.      Left Ear: External ear normal.      Nose: Nose normal.   Cardiovascular:      Rate and Rhythm: Normal rate.   Pulmonary:      Effort: Pulmonary effort is normal. No respiratory distress.   Abdominal:      Comments: Soft,  non-distended, appropriate post op tenderness. Incisions c/d/I without SOI   Musculoskeletal:         General: Normal range of motion.      Cervical back: Normal range of motion and neck supple.   Skin:     General: Skin is warm and dry.   Neurological:      Mental Status: She is alert and oriented to person, place, and time.   Psychiatric:         Mood and Affect: Mood normal.         Behavior: Behavior normal.          Significant Labs:  I have reviewed all pertinent lab results within the past 24 hours.  CBC:   Recent Labs   Lab 11/03/23  0603   WBC 23.06*   RBC 3.85*   HGB 12.1   HCT 36.0*      MCV 94   MCH 31.4*   MCHC 33.6     BMP:   Recent Labs   Lab 11/03/23  0603   *      K 3.0*      CO2 28   BUN 24*   CREATININE 0.8   CALCIUM 9.2     CMP:   Recent Labs   Lab 11/03/23  0603   *   CALCIUM 9.2   ALBUMIN 3.2*   PROT 5.9*      K 3.0*   CO2 28      BUN 24*   CREATININE 0.8   ALKPHOS 76   *   *   BILITOT 1.7*       Significant Diagnostics:  I have reviewed all pertinent imaging results/findings within the past 24 hours.

## 2023-11-03 NOTE — PLAN OF CARE
Patient remained free of any injury throughout shift. VSS. Pain control. PRN antiemetics. Call light in reach and side rails x2. Will continue with plan of care.     Problem: Adult Inpatient Plan of Care  Goal: Absence of Hospital-Acquired Illness or Injury  Outcome: Ongoing, Progressing     Problem: Adult Inpatient Plan of Care  Goal: Optimal Comfort and Wellbeing  Outcome: Ongoing, Progressing

## 2023-11-04 LAB
ALBUMIN SERPL BCP-MCNC: 3 G/DL (ref 3.5–5.2)
ALP SERPL-CCNC: 69 U/L (ref 55–135)
ALT SERPL W/O P-5'-P-CCNC: 148 U/L (ref 10–44)
ANION GAP SERPL CALC-SCNC: 9 MMOL/L (ref 8–16)
AST SERPL-CCNC: 103 U/L (ref 10–40)
BASOPHILS # BLD AUTO: 0.04 K/UL (ref 0–0.2)
BASOPHILS NFR BLD: 0.5 % (ref 0–1.9)
BILIRUB SERPL-MCNC: 1.1 MG/DL (ref 0.1–1)
BUN SERPL-MCNC: 21 MG/DL (ref 8–23)
CALCIUM SERPL-MCNC: 8.7 MG/DL (ref 8.7–10.5)
CHLORIDE SERPL-SCNC: 104 MMOL/L (ref 95–110)
CO2 SERPL-SCNC: 31 MMOL/L (ref 23–29)
CREAT SERPL-MCNC: 0.8 MG/DL (ref 0.5–1.4)
DIFFERENTIAL METHOD: ABNORMAL
EOSINOPHIL # BLD AUTO: 0.2 K/UL (ref 0–0.5)
EOSINOPHIL NFR BLD: 2.3 % (ref 0–8)
ERYTHROCYTE [DISTWIDTH] IN BLOOD BY AUTOMATED COUNT: 12.3 % (ref 11.5–14.5)
EST. GFR  (NO RACE VARIABLE): >60 ML/MIN/1.73 M^2
GLUCOSE SERPL-MCNC: 101 MG/DL (ref 70–110)
HCT VFR BLD AUTO: 36.1 % (ref 37–48.5)
HGB BLD-MCNC: 12.1 G/DL (ref 12–16)
IMM GRANULOCYTES # BLD AUTO: 0.03 K/UL (ref 0–0.04)
IMM GRANULOCYTES NFR BLD AUTO: 0.3 % (ref 0–0.5)
LYMPHOCYTES # BLD AUTO: 1.2 K/UL (ref 1–4.8)
LYMPHOCYTES NFR BLD: 14.2 % (ref 18–48)
MCH RBC QN AUTO: 31.8 PG (ref 27–31)
MCHC RBC AUTO-ENTMCNC: 33.5 G/DL (ref 32–36)
MCV RBC AUTO: 95 FL (ref 82–98)
MONOCYTES # BLD AUTO: 0.6 K/UL (ref 0.3–1)
MONOCYTES NFR BLD: 7.3 % (ref 4–15)
NEUTROPHILS # BLD AUTO: 6.6 K/UL (ref 1.8–7.7)
NEUTROPHILS NFR BLD: 75.4 % (ref 38–73)
NRBC BLD-RTO: 0 /100 WBC
PLATELET # BLD AUTO: 169 K/UL (ref 150–450)
PMV BLD AUTO: 10.6 FL (ref 9.2–12.9)
POTASSIUM SERPL-SCNC: 3.1 MMOL/L (ref 3.5–5.1)
PROT SERPL-MCNC: 5.7 G/DL (ref 6–8.4)
RBC # BLD AUTO: 3.8 M/UL (ref 4–5.4)
SODIUM SERPL-SCNC: 144 MMOL/L (ref 136–145)
WBC # BLD AUTO: 8.75 K/UL (ref 3.9–12.7)

## 2023-11-04 PROCEDURE — 94761 N-INVAS EAR/PLS OXIMETRY MLT: CPT

## 2023-11-04 PROCEDURE — 36415 COLL VENOUS BLD VENIPUNCTURE: CPT | Performed by: STUDENT IN AN ORGANIZED HEALTH CARE EDUCATION/TRAINING PROGRAM

## 2023-11-04 PROCEDURE — 36415 COLL VENOUS BLD VENIPUNCTURE: CPT | Performed by: SURGERY

## 2023-11-04 PROCEDURE — 11000001 HC ACUTE MED/SURG PRIVATE ROOM

## 2023-11-04 PROCEDURE — 85025 COMPLETE CBC W/AUTO DIFF WBC: CPT | Performed by: SURGERY

## 2023-11-04 PROCEDURE — 25000003 PHARM REV CODE 250

## 2023-11-04 PROCEDURE — 80053 COMPREHEN METABOLIC PANEL: CPT | Performed by: STUDENT IN AN ORGANIZED HEALTH CARE EDUCATION/TRAINING PROGRAM

## 2023-11-04 RX ADMIN — PROMETHAZINE HYDROCHLORIDE 12.5 MG: 12.5 TABLET ORAL at 09:11

## 2023-11-04 RX ADMIN — ACETAMINOPHEN 650 MG: 325 TABLET ORAL at 09:11

## 2023-11-04 NOTE — PROGRESS NOTES
[facility] Surg  General Surgery  Progress Note    Subjective:     History of Present Illness:  Pt is a 67yo F with known biliary colic who presents with 24h intractable RUQ pain. Reports sharp and worsened with fatty foods. Has associated chills and nausea. Denies fevers or vomiting. Pain had some relief with several doses of narcotic pain medications.  Workup in ED included - Significant labs: WBC 6, Hb 14, K 2.9, Tbili 1.6. US; stones and sludge impacted in neck of gallbladder; CBD 4mm. Pt admitted to general surgery for acute cholecystitis.         Post-Op Info:  Procedure(s) (LRB):  XI ROBOTIC CHOLECYSTECTOMY (N/A)   2 Days Post-Op     Interval History:  Pain improving.  Liver function tests improving.  MRCP raise the question of sludge in the bile duct.      Regular diet   Repeat CBC.      GI consult for possible need for ERCP     Medications:  Continuous Infusions:  Scheduled Meds:   acetaminophen  650 mg Oral Q6H    ketorolac  15 mg Intravenous Q6H    LORazepam  1 mg Oral Once    methocarbamoL  750 mg Oral QID     PRN Meds:HYDROmorphone, morphine, ondansetron, oxyCODONE, promethazine, sodium chloride 0.9%     Review of patient's allergies indicates:   Allergen Reactions    Procardia [nifedipine] Shortness Of Breath     Objective:     Vital Signs (Most Recent):  Temp: 98.3 °F (36.8 °C) (11/04/23 0722)  Pulse: 70 (11/04/23 0722)  Resp: 14 (11/04/23 0722)  BP: 134/63 (11/04/23 0722)  SpO2: (!) 93 % (11/04/23 0739) Vital Signs (24h Range):  Temp:  [96.6 °F (35.9 °C)-99.1 °F (37.3 °C)] 98.3 °F (36.8 °C)  Pulse:  [68-93] 70  Resp:  [14-20] 14  SpO2:  [93 %-100 %] 93 %  BP: (104-141)/(51-66) 134/63     Weight: 70.5 kg (155 lb 5 oz)  Body mass index is 24.33 kg/m².    Intake/Output - Last 3 Shifts         11/02 0700 11/03 0659 11/03 0700 11/04 0659 11/04 0700 11/05 0659    P.O. 240      IV Piggyback 800      Total Intake(mL/kg) 1040 (14.8)      Urine (mL/kg/hr)       Emesis/NG output       Other       Stool        Blood 25      Total Output 25      Net +1015             Urine Occurrence 6 x               Physical Exam  Vitals reviewed.   Constitutional:       Appearance: Normal appearance. She is well-developed.   HENT:      Head: Normocephalic.   Eyes:      Pupils: Pupils are equal, round, and reactive to light.   Neck:      Thyroid: No thyromegaly.      Vascular: No JVD.      Trachea: No tracheal deviation.   Cardiovascular:      Rate and Rhythm: Normal rate and regular rhythm.      Heart sounds: Normal heart sounds.   Pulmonary:      Breath sounds: Normal breath sounds. No wheezing.   Abdominal:      General: Abdomen is flat. Bowel sounds are normal. There is no distension.      Palpations: Abdomen is soft. Abdomen is not rigid. There is no mass.      Tenderness: There is no abdominal tenderness (Incisional). There is no guarding or rebound.      Comments: Incision show some ecchymosis but otherwise cleaning.   Musculoskeletal:         General: Normal range of motion.   Lymphadenopathy:      Cervical: No cervical adenopathy.   Skin:     General: Skin is warm and dry.      Findings: No erythema or rash.   Neurological:      Mental Status: She is alert and oriented to person, place, and time.          Significant Labs:  I have reviewed all pertinent lab results within the past 24 hours.  CBC:   Recent Labs   Lab 11/03/23  0603   WBC 23.06*   RBC 3.85*   HGB 12.1   HCT 36.0*      MCV 94   MCH 31.4*   MCHC 33.6     CMP:   Recent Labs   Lab 11/04/23  0527      CALCIUM 8.7   ALBUMIN 3.0*   PROT 5.7*      K 3.1*   CO2 31*      BUN 21   CREATININE 0.8   ALKPHOS 69   *   *   BILITOT 1.1*       Significant Diagnostics:  I have reviewed all pertinent imaging results/findings within the past 24 hours.  MRCP  Reading Physician Reading Date Result Priority   Adrian Meneses MD  [phone] 11/3/2023 Routine     Narrative & Impression  EXAMINATION:  MRI ABDOMEN WITH AND WO_INC MRCP (XPD)      CLINICAL HISTORY:  hyperbilirubinemia s/p cholecystectomy;  Acute cholecystitis     TECHNIQUE:  Pre and post-contrast multiplanar multisequence imaging of the abdomen was performed before and after the intravenous administration of  7 mL of Gadavist.  MRCP images include 3D MIP imaging of the biliary tract performed at the MR console.     COMPARISON:  Multiple priors.     FINDINGS:  Bibasilar pulmonary opacities favoring atelectasis.  Question trace pleural effusions.     Mildly heterogeneous signal of the liver on postcontrast images.     Bilateral renal simple cysts.  No hydronephrosis.     Pancreas is somewhat poorly evaluated secondary to motion on some sequences.  No local inflammatory changes.     Previously noted liver lesion appears to be vascular in nature possibly portal venous fistula.     Gallbladder surgically absent.  No fluid in the gallbladder fossa.     Question tiny volume of sludge in the distal common bile duct seen only on thin slice MRCP weighted images such as series 10, image 5.  Otherwise no sludge or stones in the common bile duct..  Mild intra and extrahepatic biliary ductal dilation.     Impression:     Question tiny volume of sludge in the distal common bile duct seen only on thin slice MRCP weighted images such as series 10, image 5.  This could also relate to signal loss artifact from field of view.  Otherwise no sludge or stones in the common bile duct.. Mild intra and extrahepatic biliary ductal dilation.     Complete findings as above.     This report was flagged in Epic as abnormal.        Electronically signed by: Adrian Meneses  Date:                                            11/03/2023  Time:                                           20:59      Assessment/Plan:     * Acute cholecystitis  68 y.o. female with acute cholecystitis s/p robotic cholecystectomy 11/2/23    -Tbili showed improvement   MRCP showed the possibility of sludge or microlithiasis of the bile duct.       If  retained stone/sludge may need ERCP versus continued observation as liver function tests are improving  -continue multimodal pain control  -encouraged OOB ambulation   Repeat CBC        White blood cell count is normal, discontinue antibiotics    Jose Carlson MD  General Surgery  [facility] Surg

## 2023-11-04 NOTE — PLAN OF CARE
Pt resting in bed comfortably , pt denies pain at the moment .  at bedside . Iv intact . Monitoring labs . Incision intact and RACHEL . No drainage noted . Safety measures intact . Chart check complete. Will continue to monitor .

## 2023-11-04 NOTE — SUBJECTIVE & OBJECTIVE
Interval History:  Pain improving.  Liver function tests improving.  MRCP raise the question of sludge in the bile duct.      Regular diet   Repeat CBC.      Defer the need for GI consult for possible ERCP to operative surgeon    Medications:  Continuous Infusions:  Scheduled Meds:   acetaminophen  650 mg Oral Q6H    ketorolac  15 mg Intravenous Q6H    LORazepam  1 mg Oral Once    methocarbamoL  750 mg Oral QID     PRN Meds:HYDROmorphone, morphine, ondansetron, oxyCODONE, promethazine, sodium chloride 0.9%     Review of patient's allergies indicates:   Allergen Reactions    Procardia [nifedipine] Shortness Of Breath     Objective:     Vital Signs (Most Recent):  Temp: 98.3 °F (36.8 °C) (11/04/23 0722)  Pulse: 70 (11/04/23 0722)  Resp: 14 (11/04/23 0722)  BP: 134/63 (11/04/23 0722)  SpO2: (!) 93 % (11/04/23 0739) Vital Signs (24h Range):  Temp:  [96.6 °F (35.9 °C)-99.1 °F (37.3 °C)] 98.3 °F (36.8 °C)  Pulse:  [68-93] 70  Resp:  [14-20] 14  SpO2:  [93 %-100 %] 93 %  BP: (104-141)/(51-66) 134/63     Weight: 70.5 kg (155 lb 5 oz)  Body mass index is 24.33 kg/m².    Intake/Output - Last 3 Shifts         11/02 0700 11/03 0659 11/03 0700 11/04 0659 11/04 0700 11/05 0659    P.O. 240      IV Piggyback 800      Total Intake(mL/kg) 1040 (14.8)      Urine (mL/kg/hr)       Emesis/NG output       Other       Stool       Blood 25      Total Output 25      Net +1015             Urine Occurrence 6 x               Physical Exam  Vitals reviewed.   Constitutional:       Appearance: Normal appearance. She is well-developed.   HENT:      Head: Normocephalic.   Eyes:      Pupils: Pupils are equal, round, and reactive to light.   Neck:      Thyroid: No thyromegaly.      Vascular: No JVD.      Trachea: No tracheal deviation.   Cardiovascular:      Rate and Rhythm: Normal rate and regular rhythm.      Heart sounds: Normal heart sounds.   Pulmonary:      Breath sounds: Normal breath sounds. No wheezing.   Abdominal:      General: Abdomen  is flat. Bowel sounds are normal. There is no distension.      Palpations: Abdomen is soft. Abdomen is not rigid. There is no mass.      Tenderness: There is no abdominal tenderness (Incisional). There is no guarding or rebound.      Comments: Incision show some ecchymosis but otherwise cleaning.   Musculoskeletal:         General: Normal range of motion.   Lymphadenopathy:      Cervical: No cervical adenopathy.   Skin:     General: Skin is warm and dry.      Findings: No erythema or rash.   Neurological:      Mental Status: She is alert and oriented to person, place, and time.          Significant Labs:  I have reviewed all pertinent lab results within the past 24 hours.  CBC:   Recent Labs   Lab 11/03/23  0603   WBC 23.06*   RBC 3.85*   HGB 12.1   HCT 36.0*      MCV 94   MCH 31.4*   MCHC 33.6     CMP:   Recent Labs   Lab 11/04/23  0527      CALCIUM 8.7   ALBUMIN 3.0*   PROT 5.7*      K 3.1*   CO2 31*      BUN 21   CREATININE 0.8   ALKPHOS 69   *   *   BILITOT 1.1*       Significant Diagnostics:  I have reviewed all pertinent imaging results/findings within the past 24 hours.  MRCP  Reading Physician Reading Date Result Priority   Adrian Meneses MD  [phone] 11/3/2023 Routine     Narrative & Impression  EXAMINATION:  MRI ABDOMEN WITH AND WO_INC MRCP (XPD)     CLINICAL HISTORY:  hyperbilirubinemia s/p cholecystectomy;  Acute cholecystitis     TECHNIQUE:  Pre and post-contrast multiplanar multisequence imaging of the abdomen was performed before and after the intravenous administration of  7 mL of Gadavist.  MRCP images include 3D MIP imaging of the biliary tract performed at the MR console.     COMPARISON:  Multiple priors.     FINDINGS:  Bibasilar pulmonary opacities favoring atelectasis.  Question trace pleural effusions.     Mildly heterogeneous signal of the liver on postcontrast images.     Bilateral renal simple cysts.  No hydronephrosis.     Pancreas is somewhat  poorly evaluated secondary to motion on some sequences.  No local inflammatory changes.     Previously noted liver lesion appears to be vascular in nature possibly portal venous fistula.     Gallbladder surgically absent.  No fluid in the gallbladder fossa.     Question tiny volume of sludge in the distal common bile duct seen only on thin slice MRCP weighted images such as series 10, image 5.  Otherwise no sludge or stones in the common bile duct..  Mild intra and extrahepatic biliary ductal dilation.     Impression:     Question tiny volume of sludge in the distal common bile duct seen only on thin slice MRCP weighted images such as series 10, image 5.  This could also relate to signal loss artifact from field of view.  Otherwise no sludge or stones in the common bile duct.. Mild intra and extrahepatic biliary ductal dilation.     Complete findings as above.     This report was flagged in Epic as abnormal.        Electronically signed by: Adrian Meneses  Date:                                            11/03/2023  Time:                                           20:59

## 2023-11-04 NOTE — ASSESSMENT & PLAN NOTE
68 y.o. female with acute cholecystitis s/p robotic cholecystectomy 11/2/23    -Tbili showed improvement   MRCP showed the possibility of sludge or microlithiasis of the bile duct.       If retained stone/sludge may need ERCP versus continued observation as liver function tests are improving  -continue multimodal pain control  -encouraged OOB ambulation   Repeat CBC

## 2023-11-04 NOTE — PLAN OF CARE
Patient remained free of any injury throughout shift. VSS. Pain control. PRN antiemetics. Monitoring labs. Call light in reach and side rails x2. Will continue with plan of care.     Problem: Adult Inpatient Plan of Care  Goal: Absence of Hospital-Acquired Illness or Injury  Outcome: Ongoing, Progressing     Problem: Adult Inpatient Plan of Care  Goal: Optimal Comfort and Wellbeing  Outcome: Ongoing, Progressing

## 2023-11-05 VITALS
HEIGHT: 67 IN | TEMPERATURE: 98 F | RESPIRATION RATE: 17 BRPM | HEART RATE: 62 BPM | DIASTOLIC BLOOD PRESSURE: 59 MMHG | OXYGEN SATURATION: 95 % | WEIGHT: 155.31 LBS | BODY MASS INDEX: 24.38 KG/M2 | SYSTOLIC BLOOD PRESSURE: 123 MMHG

## 2023-11-05 LAB
ALBUMIN SERPL BCP-MCNC: 2.8 G/DL (ref 3.5–5.2)
ALP SERPL-CCNC: 78 U/L (ref 55–135)
ALT SERPL W/O P-5'-P-CCNC: 131 U/L (ref 10–44)
ANION GAP SERPL CALC-SCNC: 9 MMOL/L (ref 8–16)
AST SERPL-CCNC: 69 U/L (ref 10–40)
BASOPHILS # BLD AUTO: 0.05 K/UL (ref 0–0.2)
BASOPHILS NFR BLD: 0.9 % (ref 0–1.9)
BILIRUB SERPL-MCNC: 1 MG/DL (ref 0.1–1)
BUN SERPL-MCNC: 15 MG/DL (ref 8–23)
CALCIUM SERPL-MCNC: 8.8 MG/DL (ref 8.7–10.5)
CHLORIDE SERPL-SCNC: 107 MMOL/L (ref 95–110)
CO2 SERPL-SCNC: 28 MMOL/L (ref 23–29)
CREAT SERPL-MCNC: 0.7 MG/DL (ref 0.5–1.4)
DIFFERENTIAL METHOD: ABNORMAL
EOSINOPHIL # BLD AUTO: 0.6 K/UL (ref 0–0.5)
EOSINOPHIL NFR BLD: 10 % (ref 0–8)
ERYTHROCYTE [DISTWIDTH] IN BLOOD BY AUTOMATED COUNT: 12.3 % (ref 11.5–14.5)
EST. GFR  (NO RACE VARIABLE): >60 ML/MIN/1.73 M^2
GLUCOSE SERPL-MCNC: 100 MG/DL (ref 70–110)
HCT VFR BLD AUTO: 35.7 % (ref 37–48.5)
HGB BLD-MCNC: 12 G/DL (ref 12–16)
IMM GRANULOCYTES # BLD AUTO: 0.01 K/UL (ref 0–0.04)
IMM GRANULOCYTES NFR BLD AUTO: 0.2 % (ref 0–0.5)
LYMPHOCYTES # BLD AUTO: 1.6 K/UL (ref 1–4.8)
LYMPHOCYTES NFR BLD: 27.9 % (ref 18–48)
MCH RBC QN AUTO: 31.7 PG (ref 27–31)
MCHC RBC AUTO-ENTMCNC: 33.6 G/DL (ref 32–36)
MCV RBC AUTO: 94 FL (ref 82–98)
MONOCYTES # BLD AUTO: 0.6 K/UL (ref 0.3–1)
MONOCYTES NFR BLD: 10.2 % (ref 4–15)
NEUTROPHILS # BLD AUTO: 3 K/UL (ref 1.8–7.7)
NEUTROPHILS NFR BLD: 50.8 % (ref 38–73)
NRBC BLD-RTO: 0 /100 WBC
PLATELET # BLD AUTO: 166 K/UL (ref 150–450)
PMV BLD AUTO: 10.4 FL (ref 9.2–12.9)
POTASSIUM SERPL-SCNC: 3.4 MMOL/L (ref 3.5–5.1)
PROT SERPL-MCNC: 5.6 G/DL (ref 6–8.4)
RBC # BLD AUTO: 3.79 M/UL (ref 4–5.4)
SODIUM SERPL-SCNC: 144 MMOL/L (ref 136–145)
WBC # BLD AUTO: 5.88 K/UL (ref 3.9–12.7)

## 2023-11-05 PROCEDURE — 80053 COMPREHEN METABOLIC PANEL: CPT | Performed by: SURGERY

## 2023-11-05 PROCEDURE — 85025 COMPLETE CBC W/AUTO DIFF WBC: CPT | Performed by: SURGERY

## 2023-11-05 PROCEDURE — 36415 COLL VENOUS BLD VENIPUNCTURE: CPT | Performed by: SURGERY

## 2023-11-05 PROCEDURE — 63600175 PHARM REV CODE 636 W HCPCS: Performed by: STUDENT IN AN ORGANIZED HEALTH CARE EDUCATION/TRAINING PROGRAM

## 2023-11-05 RX ORDER — ONDANSETRON HYDROCHLORIDE 8 MG/1
8 TABLET, FILM COATED ORAL EVERY 12 HOURS PRN
Qty: 20 TABLET | Refills: 1 | Status: SHIPPED | OUTPATIENT
Start: 2023-11-05 | End: 2024-01-25

## 2023-11-05 RX ORDER — OXYCODONE HYDROCHLORIDE 5 MG/1
5 TABLET ORAL EVERY 6 HOURS PRN
Qty: 15 TABLET | Refills: 0 | Status: SHIPPED | OUTPATIENT
Start: 2023-11-05 | End: 2024-01-25

## 2023-11-05 RX ADMIN — KETOROLAC TROMETHAMINE 15 MG: 30 INJECTION, SOLUTION INTRAMUSCULAR; INTRAVENOUS at 12:11

## 2023-11-05 NOTE — SUBJECTIVE & OBJECTIVE
Interval History:  Tolerated a diet.  Liver function tests show improvement.  Mild sided right abdominal pain.  Mild nausea.      Plan discharge home.  I did discuss the signs and symptoms of obstructive jaundice in relationship to the common bile duct sludge    Medications:  Continuous Infusions:  Scheduled Meds:   acetaminophen  650 mg Oral Q6H    ketorolac  15 mg Intravenous Q6H    LORazepam  1 mg Oral Once    methocarbamoL  750 mg Oral QID     PRN Meds:HYDROmorphone, morphine, ondansetron, oxyCODONE, promethazine, sodium chloride 0.9%     Review of patient's allergies indicates:   Allergen Reactions    Procardia [nifedipine] Shortness Of Breath     Objective:     Vital Signs (Most Recent):  Temp: 97.6 °F (36.4 °C) (11/05/23 0724)  Pulse: 62 (11/05/23 0724)  Resp: 17 (11/05/23 0724)  BP: (!) 123/59 (11/05/23 0724)  SpO2: 95 % (11/05/23 0724) Vital Signs (24h Range):  Temp:  [97.6 °F (36.4 °C)-99.4 °F (37.4 °C)] 97.6 °F (36.4 °C)  Pulse:  [61-73] 62  Resp:  [16-19] 17  SpO2:  [91 %-98 %] 95 %  BP: (108-145)/(55-71) 123/59     Weight: 70.5 kg (155 lb 5 oz)  Body mass index is 24.33 kg/m².    Intake/Output - Last 3 Shifts         11/03 0700 11/04 0659 11/04 0700 11/05 0659 11/05 0700 11/06 0659    P.O.  480     IV Piggyback       Total Intake(mL/kg)  480 (6.8)     Blood       Total Output       Net  +480            Urine Occurrence  1 x              Physical Exam  Vitals reviewed.   Constitutional:       Appearance: She is well-developed.   HENT:      Head: Normocephalic.   Eyes:      Pupils: Pupils are equal, round, and reactive to light.   Neck:      Thyroid: No thyromegaly.      Vascular: No JVD.      Trachea: No tracheal deviation.   Cardiovascular:      Rate and Rhythm: Normal rate and regular rhythm.      Heart sounds: Normal heart sounds.   Pulmonary:      Breath sounds: Normal breath sounds. No wheezing.   Abdominal:      General: Abdomen is flat. Bowel sounds are normal. There is no distension.       Palpations: Abdomen is soft. Abdomen is not rigid. There is no mass.      Tenderness: There is no abdominal tenderness (minimal right upper quadrant). There is no guarding or rebound.      Comments: Incisions are clean.  Mild ecchymosis.   Musculoskeletal:         General: Normal range of motion.   Lymphadenopathy:      Cervical: No cervical adenopathy.   Skin:     General: Skin is warm and dry.      Findings: No erythema or rash.   Neurological:      Mental Status: She is oriented to person, place, and time.          Significant Labs:  I have reviewed all pertinent lab results within the past 24 hours.  CBC:   Recent Labs   Lab 11/05/23  0717   WBC 5.88   RBC 3.79*   HGB 12.0   HCT 35.7*      MCV 94   MCH 31.7*   MCHC 33.6     CMP:   Recent Labs   Lab 11/05/23 0717      CALCIUM 8.8   ALBUMIN 2.8*   PROT 5.6*      K 3.4*   CO2 28      BUN 15   CREATININE 0.7   ALKPHOS 78   *   AST 69*   BILITOT 1.0       Significant Diagnostics:  I have reviewed all pertinent imaging results/findings within the past 24 hours.  No new

## 2023-11-05 NOTE — ASSESSMENT & PLAN NOTE
68 y.o. female with acute cholecystitis s/p robotic cholecystectomy 11/2/23    -Tbili showed improvement   MRCP showed the possibility of sludge or microlithiasis of the bile duct.      Liver function tests show improvement.    A diet was tolerated.      The patient will be discharged home.      She was asked to notify us if she noticed she turned yellow or her urine started looking like [employer].  She will follow up with her surgeon in several weeks

## 2023-11-05 NOTE — PLAN OF CARE
[facility] - Med Surg  Discharge Final Note    Primary Care Provider: Hugh Middleton MD    Expected Discharge Date: 11/5/2023    Final Discharge Note (most recent)       Final Note - 11/05/23 0953          Final Note    Assessment Type Final Discharge Note     Anticipated Discharge Disposition Home or Self Care        Post-Acute Status    Discharge Delays None known at this time                   Contact Info       Missy Harrison MD   Specialty: Colon and Rectal Surgery    [address] [place] [address] [address] [id number]   Phone: [phone]       Next Steps: Follow up in 2 week(s)    Instructions: post op appt          No d/c needs

## 2023-11-05 NOTE — PROGRESS NOTES
[facility]  General Surgery  Progress Note    Subjective:     History of Present Illness:  Pt is a 69yo F with known biliary colic who presents with 24h intractable RUQ pain. Reports sharp and worsened with fatty foods. Has associated chills and nausea. Denies fevers or vomiting. Pain had some relief with several doses of narcotic pain medications.  Workup in ED included - Significant labs: WBC 6, Hb 14, K 2.9, Tbili 1.6. US; stones and sludge impacted in neck of gallbladder; CBD 4mm. Pt admitted to general surgery for acute cholecystitis.         Post-Op Info:  Procedure(s) (LRB):  XI ROBOTIC CHOLECYSTECTOMY (N/A)   3 Days Post-Op     Interval History:  Tolerated a diet.  Liver function tests show improvement.  Mild sided right abdominal pain.  Mild nausea.      Plan discharge home.  I did discuss the signs and symptoms of obstructive jaundice in relationship to the common bile duct sludge    Medications:  Continuous Infusions:  Scheduled Meds:   acetaminophen  650 mg Oral Q6H    ketorolac  15 mg Intravenous Q6H    LORazepam  1 mg Oral Once    methocarbamoL  750 mg Oral QID     PRN Meds:HYDROmorphone, morphine, ondansetron, oxyCODONE, promethazine, sodium chloride 0.9%     Review of patient's allergies indicates:   Allergen Reactions    Procardia [nifedipine] Shortness Of Breath     Objective:     Vital Signs (Most Recent):  Temp: 97.6 °F (36.4 °C) (11/05/23 0724)  Pulse: 62 (11/05/23 0724)  Resp: 17 (11/05/23 0724)  BP: (!) 123/59 (11/05/23 0724)  SpO2: 95 % (11/05/23 0724) Vital Signs (24h Range):  Temp:  [97.6 °F (36.4 °C)-99.4 °F (37.4 °C)] 97.6 °F (36.4 °C)  Pulse:  [61-73] 62  Resp:  [16-19] 17  SpO2:  [91 %-98 %] 95 %  BP: (108-145)/(55-71) 123/59     Weight: 70.5 kg (155 lb 5 oz)  Body mass index is 24.33 kg/m².    Intake/Output - Last 3 Shifts         11/03 0700 11/04 0659 11/04 0700 11/05 0659 11/05 0700  11/06 0659    P.O.  480     IV Piggyback       Total Intake(mL/kg)  480 (6.8)     Blood        Total Output       Net  +480            Urine Occurrence  1 x              Physical Exam  Vitals reviewed.   Constitutional:       Appearance: She is well-developed.   HENT:      Head: Normocephalic.   Eyes:      Pupils: Pupils are equal, round, and reactive to light.   Neck:      Thyroid: No thyromegaly.      Vascular: No JVD.      Trachea: No tracheal deviation.   Cardiovascular:      Rate and Rhythm: Normal rate and regular rhythm.      Heart sounds: Normal heart sounds.   Pulmonary:      Breath sounds: Normal breath sounds. No wheezing.   Abdominal:      General: Abdomen is flat. Bowel sounds are normal. There is no distension.      Palpations: Abdomen is soft. Abdomen is not rigid. There is no mass.      Tenderness: There is no abdominal tenderness (minimal right upper quadrant). There is no guarding or rebound.      Comments: Incisions are clean.  Mild ecchymosis.   Musculoskeletal:         General: Normal range of motion.   Lymphadenopathy:      Cervical: No cervical adenopathy.   Skin:     General: Skin is warm and dry.      Findings: No erythema or rash.   Neurological:      Mental Status: She is oriented to person, place, and time.          Significant Labs:  I have reviewed all pertinent lab results within the past 24 hours.  CBC:   Recent Labs   Lab 11/05/23  0717   WBC 5.88   RBC 3.79*   HGB 12.0   HCT 35.7*      MCV 94   MCH 31.7*   MCHC 33.6     CMP:   Recent Labs   Lab 11/05/23  0717      CALCIUM 8.8   ALBUMIN 2.8*   PROT 5.6*      K 3.4*   CO2 28      BUN 15   CREATININE 0.7   ALKPHOS 78   *   AST 69*   BILITOT 1.0       Significant Diagnostics:  I have reviewed all pertinent imaging results/findings within the past 24 hours.  No new    Assessment/Plan:     * Acute cholecystitis  68 y.o. female with acute cholecystitis s/p robotic cholecystectomy 11/2/23    -Tbili showed improvement   MRCP showed the possibility of sludge or microlithiasis of the bile duct.       Liver function tests show improvement.    A diet was tolerated.      The patient will be discharged home.      She was asked to notify us if she noticed she turned yellow or her urine started looking like [employer].  She will follow up with her surgeon in several weeks              Jose Carlson MD  General Surgery  [facility]

## 2023-11-05 NOTE — PLAN OF CARE
Patient remains free of hospital injury. VSS. Lap sites CDI. C/o mild abd pain, continues to decline all scheduled pain medication as well as oral PRN medications. Ambulated in [place] independently multiply times. Call light in reach, bed in low position. All patient questions answered. Purposeful rounding Q2 hours. Will continue to monitor. Chart review complete.     Problem: Adult Inpatient Plan of Care  Goal: Patient-Specific Goal (Individualized)  Outcome: Ongoing, Progressing  Flowsheets (Taken 11/5/2023 0126)  Anxieties, Fears or Concerns: denies  Individualized Care Needs: cluster care, quiet room, rest     Problem: Adult Inpatient Plan of Care  Goal: Absence of Hospital-Acquired Illness or Injury  Outcome: Ongoing, Progressing     Problem: Fall Injury Risk  Goal: Absence of Fall and Fall-Related Injury  Outcome: Ongoing, Progressing     Problem: Adult Inpatient Plan of Care  Goal: Readiness for Transition of Care  Outcome: Ongoing, Progressing

## 2023-11-05 NOTE — DISCHARGE INSTRUCTIONS
Please call for any fever, increase in pain, nausea or vomiting or redness or drainage from incision(s).    Please call the office if you notice that the white of your eyes turned yellow or if you notice your urine is looking dark brown like [place]:    No lifting more than 30 pounds for 2 weeks    No driving if taking the pain medicine    May shower , but no soaking in the tub    Removed the glue when it becomes loose    If you become constipated from the pain medication you can use over the counter laxatives,  Miralax or Glycolax, or milk of magnesia for severe constipation.    Our office phone numbers are  [phone] and     Our office fax  number is #[phone]

## 2023-11-05 NOTE — DISCHARGE SUMMARY
[facility] S[facility]  General Surgery  Discharge Summary      Patient Name: Jemma Pastrana  MRN: [id number]  Admission Date: 11/1/2023  Hospital Length of Stay: 2 days  Discharge Date and Time:  11/05/2023 9:48 AM  Attending Physician: Missy Harrison MD   Discharging Provider: Daphney Thorne MD  Primary Care Provider: Hugh Middleton MD    HPI:   Pt is a 69yo F with known biliary colic who presents with 24h intractable RUQ pain. Reports sharp and worsened with fatty foods. Has associated chills and nausea. Denies fevers or vomiting. Pain had some relief with several doses of narcotic pain medications.  Workup in ED included - Significant labs: WBC 6, Hb 14, K 2.9, Tbili 1.6. US; stones and sludge impacted in neck of gallbladder; CBD 4mm. Pt admitted to general surgery for acute cholecystitis.         Procedure(s) (LRB):  XI ROBOTIC CHOLECYSTECTOMY (N/A)      Indwelling Lines/Drains at time of discharge:   Lines/Drains/Airways     None               Hospital Course: POD 1: pt with improved abdominal pain. Tolerating diet without n/v. Tbili elevated post op    Postop day 2.  Improved abdominal pain.  Tolerated diet.  Liver function tests show improvement.  MRCP raise the question of sludge in the common bile duct.    Postop day 3.  Tolerated a diet with a small amount of nausea.  Has some right flank pain.  Afebrile.  White blood cell count normal.  Liver function tests show improvement.  Will plan discharge home      Goals of Care Treatment Preferences:  Code Status: Full Code      Consults:   Consults (From admission, onward)        Status Ordering Provider     Inpatient consult to Gastroenterology  Once        Provider:  (Not yet assigned)    Acknowledged DAPHNEY THORNE      A GI consult was requested.  The GI service recommended that the liver function passed be trended and if they are increasing then evaluation for ERCP     Significant Diagnostic Studies: Labs:   BMP:   Recent Labs   Lab 11/04/23  0527  11/05/23  0717    100    144   K 3.1* 3.4*    107   CO2 31* 28   BUN 21 15   CREATININE 0.8 0.7   CALCIUM 8.7 8.8   , CMP   Recent Labs   Lab 11/04/23  0527 11/05/23  0717    144   K 3.1* 3.4*    107   CO2 31* 28    100   BUN 21 15   CREATININE 0.8 0.7   CALCIUM 8.7 8.8   PROT 5.7* 5.6*   ALBUMIN 3.0* 2.8*   BILITOT 1.1* 1.0   ALKPHOS 69 78   * 69*   * 131*   ANIONGAP 9 9    and CBC   Recent Labs   Lab 11/04/23  1120 11/05/23  0717   WBC 8.75 5.88   HGB 12.1 12.0   HCT 36.1* 35.7*    166     Radiology: CT scan: CT ABDOMEN PELVIS WITH CONTRAST: No results found for this visit on 11/01/23. and CT ABDOMEN PELVIS WITHOUT CONTRAST: No results found for this visit on 11/01/23.  Ultrasound: Narrative & Impression  EXAMINATION:  US ABDOMEN LIMITED     CLINICAL HISTORY:  Right upper quadrant pain     TECHNIQUE:  Limited ultrasound of the right upper quadrant of the abdomen (including pancreas, liver, gallbladder, common bile duct, and Right kidney) was performed.     COMPARISON:  None.     FINDINGS:  Pancreas: No acute sonographic abnormality.     Liver: Normal in size, measuring 16 cm. Homogeneous echotexture. 1.0 cm hyperechoic focus possibly hemangioma but not definitively characterized.  This appears similar to the prior exam of 08/06/2019 allowing for differences in modality.     Gallbladder: Sludge and stones in the neck of the gallbladder.  Parra sign negative per the sonographer.  No wall thickening.     Biliary system: The common duct is not dilated, measuring 4 mm.  No intrahepatic ductal dilatation.     Right kidney: 4.5 cm right upper pole cyst.  Kidneys normal in size measuring 11.0 cm.     Miscellaneous: No upper abdominal ascites.     Impression:     No acute abnormality.     Sludge and stones the gallbladder without findings for acute cholecystitis.     Probable liver hemangioma.        Electronically signed by: Adrian Meneses  Date:                                             11/01/2023  Time:                                           16:53    Contains abnormal data MRI Abdomen W WO Contrast_INC MRCP (XPD)  Order: [id number]   Status: Final result      Visible to patient: Yes (seen)      Next appt: None      Dx: Acute cholecystitis      0 Result Notes  Details    Reading Physician Reading Date Result Priority   Adrian Meneses MD  [phone] 11/3/2023 Routine     Narrative & Impression  EXAMINATION:  MRI ABDOMEN WITH AND WO_INC MRCP (XPD)     CLINICAL HISTORY:  hyperbilirubinemia s/p cholecystectomy;  Acute cholecystitis     TECHNIQUE:  Pre and post-contrast multiplanar multisequence imaging of the abdomen was performed before and after the intravenous administration of  7 mL of Gadavist.  MRCP images include 3D MIP imaging of the biliary tract performed at the MR console.     COMPARISON:  Multiple priors.     FINDINGS:  Bibasilar pulmonary opacities favoring atelectasis.  Question trace pleural effusions.     Mildly heterogeneous signal of the liver on postcontrast images.     Bilateral renal simple cysts.  No hydronephrosis.     Pancreas is somewhat poorly evaluated secondary to motion on some sequences.  No local inflammatory changes.     Previously noted liver lesion appears to be vascular in nature possibly portal venous fistula.     Gallbladder surgically absent.  No fluid in the gallbladder fossa.     Question tiny volume of sludge in the distal common bile duct seen only on thin slice MRCP weighted images such as series 10, image 5.  Otherwise no sludge or stones in the common bile duct..  Mild intra and extrahepatic biliary ductal dilation.     Impression:     Question tiny volume of sludge in the distal common bile duct seen only on thin slice MRCP weighted images such as series 10, image 5.  This could also relate to signal loss artifact from field of view.  Otherwise no sludge or stones in the common bile duct.. Mild intra and extrahepatic  biliary ductal dilation.     Complete findings as above.     This report was flagged in Epic as abnormal.        Electronically signed by: Adrian Meneses  Date:                                            11/03/2023  Time:                                           20:59         Pending Diagnostic Studies:     Procedure Component Value Units Date/Time    Specimen to Pathology, Surgery General Surgery [[id number]] Collected: 11/02/23 1306    Order Status: Sent Lab Status: In process Updated: 11/02/23 1604    Specimen: Tissue         Final Active Diagnoses:    Diagnosis Date Noted POA    PRINCIPAL PROBLEM:  Acute cholecystitis [K81.0] 11/01/2023 Yes      Problems Resolved During this Admission:      Discharged Condition: stable    Disposition: Home or Self Care    Follow Up:   Follow-up Information     Missy Harrison MD Follow up in 2 week(s).    Specialty: Colon and Rectal Surgery  Why: post op appt  Contact information:  [address] [place] [address] Dr Betzy RENEE [id number] [phone]                       Patient Instructions:      Lifting restrictions   Order Comments: No lifting more than 30 pounds for 2 weeks     No driving until:   Order Comments: Not taking narcotic pain medicine     Notify your health care provider if you experience any of the following:  temperature >100.4     Notify your health care provider if you experience any of the following:  persistent nausea and vomiting or diarrhea     Notify your health care provider if you experience any of the following:  severe uncontrolled pain     Notify your health care provider if you experience any of the following:  redness, tenderness, or signs of infection (pain, swelling, redness, odor or green/yellow discharge around incision site)     Notify your health care provider if you experience any of the following:   Order Comments: You notice that the white of your eyes is turning yellow or if your urine starts to look brownish like [employer]     No dressing  needed     Medications:  Reconciled Home Medications:      Medication List      START taking these medications    ondansetron 8 MG tablet  Commonly known as: ZOFRAN  Take 1 tablet (8 mg total) by mouth every 12 (twelve) hours as needed for Nausea.     oxyCODONE 5 MG immediate release tablet  Commonly known as: ROXICODONE  Take 1 tablet (5 mg total) by mouth every 6 (six) hours as needed.        CONTINUE taking these medications    amLODIPine 10 MG tablet  Commonly known as: NORVASC  Take 10 mg by mouth once daily.     chlorthalidone 25 MG Tab  Commonly known as: HYGROTEN  Take 12.5 mg by mouth once daily.     * irbesartan 75 MG tablet  Commonly known as: AVAPRO  Take 75 mg by mouth once daily.     * irbesartan 75 MG tablet  Commonly known as: AVAPRO  Take 150 mg by mouth once daily.     nitroGLYCERIN 0.4 MG SL tablet  Commonly known as: NITROSTAT  Take 0.4 mg by mouth every 5 (five) minutes as needed.     pantoprazole 40 MG tablet  Commonly known as: PROTONIX  Take 40 mg by mouth once daily.     tafluprost (PF) 0.0015 % Dpet  Commonly known as: ZIOPTAN (PF)  Apply 1 drop to eye once daily.         * This list has 2 medication(s) that are the same as other medications prescribed for you. Read the directions carefully, and ask your doctor or other care provider to review them with you.              Time spent on the discharge of patient: 12 minutes    Jose Carlson MD  General Surgery  [facility]

## 2023-11-06 ENCOUNTER — PATIENT MESSAGE (OUTPATIENT)
Dept: SURGERY | Facility: CLINIC | Age: 68
End: 2023-11-06
Payer: COMMERCIAL

## 2023-11-06 LAB
FINAL PATHOLOGIC DIAGNOSIS: NORMAL
GROSS: NORMAL
Lab: NORMAL

## 2023-11-17 ENCOUNTER — PATIENT MESSAGE (OUTPATIENT)
Dept: SURGERY | Facility: CLINIC | Age: 68
End: 2023-11-17
Payer: COMMERCIAL

## 2023-11-18 ENCOUNTER — HOSPITAL ENCOUNTER (EMERGENCY)
Facility: HOSPITAL | Age: 68
Discharge: HOME | End: 2023-11-18
Attending: EMERGENCY MEDICINE
Payer: COMMERCIAL

## 2023-11-18 ENCOUNTER — NURSE TRIAGE (OUTPATIENT)
Dept: ADMINISTRATIVE | Facility: CLINIC | Age: 68
End: 2023-11-18
Payer: COMMERCIAL

## 2023-11-18 VITALS
TEMPERATURE: 99 F | WEIGHT: 151.88 LBS | RESPIRATION RATE: 16 BRPM | HEART RATE: 77 BPM | SYSTOLIC BLOOD PRESSURE: 155 MMHG | DIASTOLIC BLOOD PRESSURE: 74 MMHG | OXYGEN SATURATION: 100 % | BODY MASS INDEX: 23.79 KG/M2

## 2023-11-18 DIAGNOSIS — R10.9 POSTOPERATIVE ABDOMINAL PAIN: Primary | ICD-10-CM

## 2023-11-18 DIAGNOSIS — G89.18 POSTOPERATIVE ABDOMINAL PAIN: Primary | ICD-10-CM

## 2023-11-18 LAB
ALBUMIN SERPL BCP-MCNC: 4.1 G/DL (ref 3.5–5.2)
ALP SERPL-CCNC: 82 U/L (ref 55–135)
ALT SERPL W/O P-5'-P-CCNC: 26 U/L (ref 10–44)
ANION GAP SERPL CALC-SCNC: 11 MMOL/L (ref 8–16)
AST SERPL-CCNC: 24 U/L (ref 10–40)
BASOPHILS # BLD AUTO: 0.07 K/UL (ref 0–0.2)
BASOPHILS NFR BLD: 1.6 % (ref 0–1.9)
BILIRUB SERPL-MCNC: 0.6 MG/DL (ref 0.1–1)
BILIRUB UR QL STRIP: NEGATIVE
BUN SERPL-MCNC: 20 MG/DL (ref 8–23)
CALCIUM SERPL-MCNC: 10.6 MG/DL (ref 8.7–10.5)
CHLORIDE SERPL-SCNC: 105 MMOL/L (ref 95–110)
CLARITY UR: CLEAR
CO2 SERPL-SCNC: 26 MMOL/L (ref 23–29)
COLOR UR: COLORLESS
CREAT SERPL-MCNC: 0.8 MG/DL (ref 0.5–1.4)
DIFFERENTIAL METHOD: ABNORMAL
EOSINOPHIL # BLD AUTO: 0.1 K/UL (ref 0–0.5)
EOSINOPHIL NFR BLD: 2.7 % (ref 0–8)
ERYTHROCYTE [DISTWIDTH] IN BLOOD BY AUTOMATED COUNT: 12.5 % (ref 11.5–14.5)
EST. GFR  (NO RACE VARIABLE): >60 ML/MIN/1.73 M^2
GLUCOSE SERPL-MCNC: 106 MG/DL (ref 70–110)
GLUCOSE UR QL STRIP: NEGATIVE
HCT VFR BLD AUTO: 42 % (ref 37–48.5)
HGB BLD-MCNC: 14.1 G/DL (ref 12–16)
HGB UR QL STRIP: NEGATIVE
IMM GRANULOCYTES # BLD AUTO: 0.01 K/UL (ref 0–0.04)
IMM GRANULOCYTES NFR BLD AUTO: 0.2 % (ref 0–0.5)
KETONES UR QL STRIP: NEGATIVE
LEUKOCYTE ESTERASE UR QL STRIP: NEGATIVE
LIPASE SERPL-CCNC: 34 U/L (ref 4–60)
LYMPHOCYTES # BLD AUTO: 1.5 K/UL (ref 1–4.8)
LYMPHOCYTES NFR BLD: 33.7 % (ref 18–48)
MCH RBC QN AUTO: 31.8 PG (ref 27–31)
MCHC RBC AUTO-ENTMCNC: 33.6 G/DL (ref 32–36)
MCV RBC AUTO: 95 FL (ref 82–98)
MONOCYTES # BLD AUTO: 0.4 K/UL (ref 0.3–1)
MONOCYTES NFR BLD: 9 % (ref 4–15)
NEUTROPHILS # BLD AUTO: 2.4 K/UL (ref 1.8–7.7)
NEUTROPHILS NFR BLD: 52.8 % (ref 38–73)
NITRITE UR QL STRIP: NEGATIVE
NRBC BLD-RTO: 0 /100 WBC
PH UR STRIP: 6 [PH] (ref 5–8)
PLATELET # BLD AUTO: 343 K/UL (ref 150–450)
PMV BLD AUTO: 10.1 FL (ref 9.2–12.9)
POTASSIUM SERPL-SCNC: 3.6 MMOL/L (ref 3.5–5.1)
PROT SERPL-MCNC: 7.4 G/DL (ref 6–8.4)
PROT UR QL STRIP: NEGATIVE
RBC # BLD AUTO: 4.43 M/UL (ref 4–5.4)
SODIUM SERPL-SCNC: 142 MMOL/L (ref 136–145)
SP GR UR STRIP: 1.01 (ref 1–1.03)
URN SPEC COLLECT METH UR: ABNORMAL
UROBILINOGEN UR STRIP-ACNC: NEGATIVE EU/DL
WBC # BLD AUTO: 4.45 K/UL (ref 3.9–12.7)

## 2023-11-18 PROCEDURE — 83690 ASSAY OF LIPASE: CPT | Performed by: PHYSICIAN ASSISTANT

## 2023-11-18 PROCEDURE — 80053 COMPREHEN METABOLIC PANEL: CPT | Performed by: PHYSICIAN ASSISTANT

## 2023-11-18 PROCEDURE — 99283 EMERGENCY DEPT VISIT LOW MDM: CPT

## 2023-11-18 PROCEDURE — 85025 COMPLETE CBC W/AUTO DIFF WBC: CPT | Performed by: PHYSICIAN ASSISTANT

## 2023-11-18 PROCEDURE — 81003 URINALYSIS AUTO W/O SCOPE: CPT | Performed by: PHYSICIAN ASSISTANT

## 2023-11-18 RX ORDER — TRAMADOL HYDROCHLORIDE 50 MG/1
50 TABLET ORAL EVERY 6 HOURS PRN
Qty: 20 TABLET | Refills: 0 | Status: SHIPPED | OUTPATIENT
Start: 2023-11-18 | End: 2023-11-25

## 2023-11-18 NOTE — TELEPHONE ENCOUNTER
Pt is s/p cholecystectomy on 11/02/23 and was hospitalized for a week post-op. Day before yesterday pt started to experience pain on her right side and became extremely nauseated this morning. Denies vomiting and diarrhea. Pt states that the pain can be severe, though it is not severe currently. Prescribed pain medication did not relieve symptoms. She is supposed to fly out of state tomorrow to visit her son and asks if this is okay. OCP, Dr. Rowley, is called for further guidance.    Dr. Rowley advises that if pt feels her symptoms are new/worsening she should be evaluated in the ED. He states that it is the patient's decision to travel tomorrow and if she can wait to be seen in clinic. Pt is informed. She verbalizes understanding and is instructed to call back with any new/worsening sxs, questions, or concerns.   Reason for Disposition   [1] SEVERE post-op pain (e.g., excruciating, pain scale 8-10) AND [2] not controlled with pain medications    Additional Information   Negative: Sounds like a life-threatening emergency to the triager   Negative: [1] Widespread rash AND [2] bright red, sunburn-like   Negative: [1] SEVERE headache AND [2] after spinal (epidural) anesthesia   Negative: [1] Vomiting AND [2] persists > 4 hours   Negative: [1] Vomiting AND [2] abdomen looks much more swollen than usual   Negative: [1] Drinking very little AND [2] dehydration suspected (e.g., no urine > 12 hours, very dry mouth, very lightheaded)   Negative: Patient sounds very sick or weak to the triager   Negative: Sounds like a serious complication to the triager   Negative: Fever > 100.4 F (38.0 C)    Protocols used: Post-Op Symptoms and [id number]

## 2023-11-18 NOTE — ED PROVIDER NOTES
"SCRIBE #1 NOTE: I, Jj Hines, am scribing for, and in the presence of, Radhames Archer MD. I have scribed the entire note.       History     Chief Complaint   Patient presents with    Abdominal Pain     Pt had gall bladder removed 2 weeks ago. States still having RUQ abdominal pain, and nausea. Told by GI to come to ER for eval.     HPI  2023, 1:30 PM  History obtained from the patient    HPI:  Jemma Pastrana is a 68 y.o. female with a PMH of HTN, osteoporosis, urolithiasis, depression, and recurrent URI who presents to the [facility] emergency department for evaluation of RUQ abd pain which onset 2 days ago. Pt had her gall bladder removed 2 weeks ago. Pt reports she has not lifted anything heavy, although she did cook. Pt reports she was told to come to ED by GI if symptoms worsened. Pt is traveling soon by plane, so she wanted to come in to get checked. Associated symptoms include nausea. Pt denies any fever or diarrhea. Pt reports she took oxycodone for pain, which felt like "ants." No other complaints or concerns.     Arrival mode: Personal vehicle    PCP: Hugh Middleton MD    Review of patient's allergies indicates:   Allergen Reactions    Procardia [nifedipine] Shortness Of Breath      Past Medical History:   Diagnosis Date    Depression     Hypertension     Osteoporosis     Recurrent upper respiratory infection (URI)     Urolithiasis      Past Surgical History:   Procedure Laterality Date    ADENOIDECTOMY      BLADDER SUSPENSION       SECTION, LOW TRANSVERSE      x 1    HYSTERECTOMY      OOPHORECTOMY      bilateral with hysterectomy, benign     ROBOT-ASSISTED CHOLECYSTECTOMY USING DA JAYSON XI N/A 2023    Procedure: XI ROBOTIC CHOLECYSTECTOMY;  Surgeon: Missy Harrison MD;  Location: [facility];  Service: Colon and Rectal;  Laterality: N/A;    Robotic assisted laparoscopic anterior colposacropexy  2012    TONSILLECTOMY      Umbilical Hernia Closure         Family History "   Problem Relation Age of Onset    Prostate cancer Father     Allergies Neg Hx      Social History     Tobacco Use    Smoking status: Former     Current packs/day: 0.00     Types: Cigarettes     Quit date: 1983     Years since quittin.2    Smokeless tobacco: Never   Substance and Sexual Activity    Alcohol use: Not Currently     Comment: On ocassion    Drug use: No    Sexual activity: Yes      Review of Systems     Review of Systems   Constitutional:  Negative for fever.   HENT: Negative.     Eyes: Negative.    Respiratory: Negative.     Cardiovascular: Negative.    Gastrointestinal:  Positive for abdominal pain (RUQ) and nausea. Negative for diarrhea.   Endocrine: Negative.    Genitourinary: Negative.    Musculoskeletal: Negative.    Skin: Negative.    Allergic/Immunologic: Negative.    Neurological: Negative.    Hematological: Negative.    Psychiatric/Behavioral: Negative.     All other systems reviewed and are negative.       Physical Exam     Initial Vitals [23 1134]   BP Pulse Resp Temp SpO2   (!) 179/73 81 16 98.7 °F (37.1 °C) 99 %      MAP       --          Physical Exam  Nursing notes and vital signs reviewed.  Constitutional: Patient is in no distress.   Head: Normocephalic. Atraumatic.   Eyes: Conjunctivae are not pale. No scleral icterus.   ENT: Mucous membranes moist.   Neck: Supple.   Cardiovascular: Regular rate. Regular rhythm.   Pulmonary: No respiratory distress.   Abdominal: Non-distended. Mild RUQ tenderness within expected range post op. No fullness.  Musculoskeletal: Moves all extremities. No obvious deformities. No edema.   Skin: Warm and dry.   Neurological:  Alert, awake, and appropriate. Normal speech. No acute lateralizing neurologic deficits appreciated.   Psychiatric: Normal affect.       ED Course   Procedures  Vitals:    23 1134 23 1400   BP: (!) 179/73 (!) 155/74   Pulse: 81 77   Resp: 16 16   Temp: 98.7 °F (37.1 °C)    TempSrc: Oral    SpO2: 99% 100%    Weight: 68.9 kg (151 lb 14.4 oz)      Lab Results Interpreted as Abnormal:  Labs Reviewed   CBC W/ AUTO DIFFERENTIAL - Abnormal; Notable for the following components:       Result Value    MCH 31.8 (*)     All other components within normal limits   COMPREHENSIVE METABOLIC PANEL - Abnormal; Notable for the following components:    Calcium 10.6 (*)     All other components within normal limits   URINALYSIS, REFLEX TO URINE CULTURE - Abnormal; Notable for the following components:    Color, UA Colorless (*)     All other components within normal limits    Narrative:     Specimen Source->Urine   LIPASE      All Lab Results:  Results for orders placed or performed during the hospital encounter of 11/18/23   CBC W/ AUTO DIFFERENTIAL   Result Value Ref Range    WBC 4.45 3.90 - 12.70 K/uL    RBC 4.43 4.00 - 5.40 M/uL    Hemoglobin 14.1 12.0 - 16.0 g/dL    Hematocrit 42.0 37.0 - 48.5 %    MCV 95 82 - 98 fL    MCH 31.8 (H) 27.0 - 31.0 pg    MCHC 33.6 32.0 - 36.0 g/dL    RDW 12.5 11.5 - 14.5 %    Platelets 343 150 - 450 K/uL    MPV 10.1 9.2 - 12.9 fL    Immature Granulocytes 0.2 0.0 - 0.5 %    Gran # (ANC) 2.4 1.8 - 7.7 K/uL    Immature Grans (Abs) 0.01 0.00 - 0.04 K/uL    Lymph # 1.5 1.0 - 4.8 K/uL    Mono # 0.4 0.3 - 1.0 K/uL    Eos # 0.1 0.0 - 0.5 K/uL    Baso # 0.07 0.00 - 0.20 K/uL    nRBC 0 0 /100 WBC    Gran % 52.8 38.0 - 73.0 %    Lymph % 33.7 18.0 - 48.0 %    Mono % 9.0 4.0 - 15.0 %    Eosinophil % 2.7 0.0 - 8.0 %    Basophil % 1.6 0.0 - 1.9 %    Differential Method Automated    Comp. Metabolic Panel   Result Value Ref Range    Sodium 142 136 - 145 mmol/L    Potassium 3.6 3.5 - 5.1 mmol/L    Chloride 105 95 - 110 mmol/L    CO2 26 23 - 29 mmol/L    Glucose 106 70 - 110 mg/dL    BUN 20 8 - 23 mg/dL    Creatinine 0.8 0.5 - 1.4 mg/dL    Calcium 10.6 (H) 8.7 - 10.5 mg/dL    Total Protein 7.4 6.0 - 8.4 g/dL    Albumin 4.1 3.5 - 5.2 g/dL    Total Bilirubin 0.6 0.1 - 1.0 mg/dL    Alkaline Phosphatase 82 55 - 135 U/L    AST  24 10 - 40 U/L    ALT 26 10 - 44 U/L    eGFR >60 >60 mL/min/1.73 m^2    Anion Gap 11 8 - 16 mmol/L   Lipase   Result Value Ref Range    Lipase 34 4 - 60 U/L   Urinalysis, Reflex to Urine Culture Urine, Clean Catch    Specimen: Urine   Result Value Ref Range    Specimen UA Urine, Clean Catch     Color, UA Colorless (A) Yellow, Straw, Maria D    Appearance, UA Clear Clear    pH, UA 6.0 5.0 - 8.0    Specific Gravity, UA 1.010 1.005 - 1.030    Protein, UA Negative Negative    Glucose, UA Negative Negative    Ketones, UA Negative Negative    Bilirubin (UA) Negative Negative    Occult Blood UA Negative Negative    Nitrite, UA Negative Negative    Urobilinogen, UA Negative <2.0 EU/dL    Leukocytes, UA Negative Negative     Imaging Results    None           The emergency physician reviewed the vital signs and test results, which are outlined above.     ED Discussion       Patient's evaluation in the ED does not suggest any emergent or life-threatening medical conditions requiring immediate intervention beyond what was provided in the ED, and I believe patient is safe for discharge.  Regardless, an unremarkable evaluation in the ED does not preclude the development or presence of a serious or life-threatening condition. As such, patient was given return instructions for any change or worsening of symptoms.             ED Medication(s) Administered:  Medications - No data to display    Prescription Management: I performed a review of the patient's current Rx medication list as input by nursing staff.    Discharge Medication List as of 11/18/2023  1:56 PM        START taking these medications    Details   traMADoL (ULTRAM) 50 mg tablet Take 1 tablet (50 mg total) by mouth every 6 (six) hours as needed for Pain., Starting Sat 11/18/2023, Until Sat 11/25/2023 at 2359, Print           CONTINUE these medications which have NOT CHANGED    Details   amlodipine (NORVASC) 10 MG tablet Take 10 mg by mouth once daily., Until Discontinued,  Historical Med      chlorthalidone (HYGROTEN) 25 MG Tab Take 12.5 mg by mouth once daily., Until Discontinued, Historical Med      !! irbesartan (AVAPRO) 75 MG tablet Take 150 mg by mouth once daily. , Starting Tue 6/12/2018, Historical Med      !! irbesartan (AVAPRO) 75 MG tablet Take 75 mg by mouth once daily., Historical Med      nitroGLYCERIN (NITROSTAT) 0.4 MG SL tablet Take 0.4 mg by mouth every 5 (five) minutes as needed., Historical Med      ondansetron (ZOFRAN) 8 MG tablet Take 1 tablet (8 mg total) by mouth every 12 (twelve) hours as needed for Nausea., Starting Sun 11/5/2023, Normal      oxyCODONE (ROXICODONE) 5 MG immediate release tablet Take 1 tablet (5 mg total) by mouth every 6 (six) hours as needed., Starting Sun 11/5/2023, Normal      pantoprazole (PROTONIX) 40 MG tablet Take 40 mg by mouth once daily., Starting Tue 10/31/2023, Historical Med      tafluprost, PF, (ZIOPTAN, PF,) 0.0015 % Dpet Apply 1 drop to eye once daily., Historical Med       !! - Potential duplicate medications found. Please discuss with provider.              Follow-up Information       Jose Carlson MD.    Specialty: General Surgery  Why: As needed  Contact information:  [address]  [place] [address] [phone]               Hugh Middleton MD.    Specialty: Internal Medicine  Why: As needed  Contact information:  [address] [facility] [address] [phone]               [facility] - Emergency Dept..    Specialty: Emergency Medicine  Why: As needed, If symptoms worsen  Contact information:  [address] [facility] [address] [phone]                           Scribe Attestation:   Scribe #1: I performed the above scribed service and the documentation accurately describes the services I performed. I attest to the accuracy of the note.     Attending:   Physician Attestation Statement for Scribe #1: I, Radhames Archer MD, personally performed the services described in  this documentation, as scribed by Jj Hines, in my presence, and it is both accurate and complete. As with other dictation methods such as dictation software, small errors or inconsistencies may be overlooked due to the goal of spending more face-to-face time with patients.      Clinical Impression       ICD-10-CM ICD-9-CM   1. Postoperative abdominal pain  R10.9 789.00    G89.18 338.18      ED Disposition Condition    Discharge Stable              Radhames Archer MD  11/18/23 3400

## 2023-11-18 NOTE — FIRST PROVIDER EVALUATION
Emergency Department TeleTriage Encounter Note      CHIEF COMPLAINT    Chief Complaint   Patient presents with    Abdominal Pain     Pt had gall bladder removed 2 weeks ago. States still having RUQ abdominal pain, and nausea. Told by GI to come to ER for eval.       VITAL SIGNS   Initial Vitals [11/18/23 1134]   BP Pulse Resp Temp SpO2   (!) 179/73 81 16 98.7 °F (37.1 °C) 99 %      MAP       --            ALLERGIES    Review of patient's allergies indicates:   Allergen Reactions    Procardia [nifedipine] Shortness Of Breath       PROVIDER TRIAGE NOTE  Patient presents with RUQ pain. No nausea, vomiting or diarrhea. Cholecystectomy 2 weeks ago.       ORDERS  Labs Reviewed - No data to display    ED Orders (720h ago, onward)      None              Virtual Visit Note: The provider triage portion of this emergency department evaluation and documentation was performed via [employer], a HIPAA-compliant telemedicine application, in concert with a tele-presenter in the room. A face to face patient evaluation with one of my colleagues will occur once the patient is placed in an emergency department room.      DISCLAIMER: This note was prepared with [employer] voice recognition transcription software. Garbled syntax, mangled pronouns, and other bizarre constructions may be attributed to that software system.

## 2023-12-20 ENCOUNTER — PATIENT MESSAGE (OUTPATIENT)
Dept: SURGERY | Facility: HOSPITAL | Age: 68
End: 2023-12-20
Payer: COMMERCIAL

## 2024-01-25 ENCOUNTER — OFFICE VISIT (OUTPATIENT)
Dept: SURGERY | Facility: CLINIC | Age: 69
End: 2024-01-25
Payer: COMMERCIAL

## 2024-01-25 ENCOUNTER — LAB VISIT (OUTPATIENT)
Dept: LAB | Facility: HOSPITAL | Age: 69
End: 2024-01-25
Attending: SURGERY
Payer: COMMERCIAL

## 2024-01-25 VITALS
WEIGHT: 154.31 LBS | SYSTOLIC BLOOD PRESSURE: 140 MMHG | BODY MASS INDEX: 24.22 KG/M2 | HEART RATE: 79 BPM | HEIGHT: 67 IN | TEMPERATURE: 98 F | DIASTOLIC BLOOD PRESSURE: 74 MMHG

## 2024-01-25 DIAGNOSIS — Z90.49 STATUS POST CHOLECYSTECTOMY: Primary | ICD-10-CM

## 2024-01-25 DIAGNOSIS — Z90.49 STATUS POST CHOLECYSTECTOMY: ICD-10-CM

## 2024-01-25 LAB
ALBUMIN SERPL BCP-MCNC: 3.9 G/DL (ref 3.5–5.2)
ALP SERPL-CCNC: 70 U/L (ref 55–135)
ALT SERPL W/O P-5'-P-CCNC: 16 U/L (ref 10–44)
AST SERPL-CCNC: 20 U/L (ref 10–40)
BILIRUB DIRECT SERPL-MCNC: 0.4 MG/DL (ref 0.1–0.3)
BILIRUB SERPL-MCNC: 1.5 MG/DL (ref 0.1–1)
PROT SERPL-MCNC: 6.9 G/DL (ref 6–8.4)

## 2024-01-25 PROCEDURE — 3077F SYST BP >= 140 MM HG: CPT | Mod: CPTII,S$GLB,, | Performed by: SURGERY

## 2024-01-25 PROCEDURE — 99999 PR PBB SHADOW E&M-EST. PATIENT-LVL III: CPT | Mod: PBBFAC,,, | Performed by: SURGERY

## 2024-01-25 PROCEDURE — 1125F AMNT PAIN NOTED PAIN PRSNT: CPT | Mod: CPTII,S$GLB,, | Performed by: SURGERY

## 2024-01-25 PROCEDURE — 3078F DIAST BP <80 MM HG: CPT | Mod: CPTII,S$GLB,, | Performed by: SURGERY

## 2024-01-25 PROCEDURE — 1159F MED LIST DOCD IN RCRD: CPT | Mod: CPTII,S$GLB,, | Performed by: SURGERY

## 2024-01-25 PROCEDURE — 36415 COLL VENOUS BLD VENIPUNCTURE: CPT | Performed by: SURGERY

## 2024-01-25 PROCEDURE — 99024 POSTOP FOLLOW-UP VISIT: CPT | Mod: S$GLB,,, | Performed by: SURGERY

## 2024-01-25 PROCEDURE — 4010F ACE/ARB THERAPY RXD/TAKEN: CPT | Mod: CPTII,S$GLB,, | Performed by: SURGERY

## 2024-01-25 PROCEDURE — 1160F RVW MEDS BY RX/DR IN RCRD: CPT | Mod: CPTII,S$GLB,, | Performed by: SURGERY

## 2024-01-25 PROCEDURE — 80076 HEPATIC FUNCTION PANEL: CPT | Performed by: SURGERY

## 2024-01-25 NOTE — PROGRESS NOTES
Patient ID: Jemma Pastrana is a 68 y.o. female.    Chief Complaint: Abdominal Pain      HPI:  Patient was a 68-year-old female who presents for evaluation of right upper quadrant pain.  The patient underwent a cholecystectomy in November of 2023.  She has persistent sharp to cramping right upper quadrant pain that occurs intermittently.  It does not seem to be a specific relation to food.  The pain is similar to the discomfort she had prior to cholecystectomy.      The patient's abdominal pain actually started in June of last year.  She was eventually found to have cholelithiasis.  An MRCP done as part of her evaluation upon presentation to the [facility] room included an MRCP that raise the question of distal common bile duct sludge.  The patient underwent a robotic assisted cholecystectomy.      She presents now with the above complaint of abdominal pain that is similar to that before surgery        Review of Systems   Constitutional:  Negative for appetite change, chills, fatigue, fever and unexpected weight change.   HENT:  Negative for hearing loss and rhinorrhea.    Eyes:  Negative for visual disturbance.   Respiratory:  Negative for apnea, cough, shortness of breath and wheezing.    Cardiovascular:  Negative for chest pain and palpitations.   Gastrointestinal:  Positive for abdominal pain. Negative for abdominal distention, blood in stool, constipation, diarrhea, nausea and vomiting.   Genitourinary:  Negative for dysuria, frequency and urgency.   Musculoskeletal:  Negative for arthralgias and neck pain.   Skin:  Negative for rash.   Neurological:  Negative for seizures, weakness, numbness and headaches.   Hematological:  Negative for adenopathy. Does not bruise/bleed easily.   Psychiatric/Behavioral:  Negative for hallucinations. The patient is not nervous/anxious.        Current Outpatient Medications   Medication Sig Dispense Refill    chlorthalidone (HYGROTEN) 25 MG Tab Take 12.5 mg by mouth  once daily.      irbesartan (AVAPRO) 75 MG tablet Take 75 mg by mouth once daily.      pantoprazole (PROTONIX) 40 MG tablet Take 40 mg by mouth once daily.      tafluprost, PF, (ZIOPTAN, PF,) 0.0015 % Dpet Apply 1 drop to eye once daily.       No current facility-administered medications for this visit.       Review of patient's allergies indicates:   Allergen Reactions    Procardia [nifedipine] Shortness Of Breath       Past Medical History:   Diagnosis Date    Depression     Hypertension     Osteoporosis     Recurrent upper respiratory infection (URI)     Urolithiasis        Past Surgical History:   Procedure Laterality Date    ADENOIDECTOMY      BLADDER SUSPENSION       SECTION, LOW TRANSVERSE      x 1    HYSTERECTOMY      OOPHORECTOMY      bilateral with hysterectomy, benign     ROBOT-ASSISTED CHOLECYSTECTOMY USING DA [employer] XI N/A 2023    Procedure: XI ROBOTIC CHOLECYSTECTOMY;  Surgeon: Missy Harrison MD;  Location: [facility];  Service: Colon and Rectal;  Laterality: N/A;    Robotic assisted laparoscopic anterior colposacropexy      TONSILLECTOMY      Umbilical Hernia Closure         Social History     Socioeconomic History    Marital status:    Tobacco Use    Smoking status: Former     Current packs/day: 0.00     Types: Cigarettes     Quit date: 1983     Years since quittin.4    Smokeless tobacco: Never   Substance and Sexual Activity    Alcohol use: Not Currently     Comment: On ocassion    Drug use: No    Sexual activity: Yes       Vitals:    24 1006   BP: (!) 140/74   Pulse: 79   Temp: 98 °F (36.7 °C)       Physical Exam  Constitutional:       Appearance: She is well-developed.   HENT:      Head: Normocephalic.   Eyes:      Pupils: Pupils are equal, round, and reactive to light.   Neck:      Thyroid: No thyromegaly.      Vascular: No JVD.      Trachea: No tracheal deviation.   Cardiovascular:      Rate and Rhythm: Normal rate and regular rhythm.      Heart sounds:  Normal heart sounds.   Pulmonary:      Breath sounds: Normal breath sounds. No wheezing.   Abdominal:      General: Bowel sounds are normal. There is no distension.      Palpations: Abdomen is soft. Abdomen is not rigid. There is no mass.      Tenderness: There is no abdominal tenderness. There is no guarding or rebound.      Comments: Abdominal incisions have healed well   Musculoskeletal:         General: Normal range of motion.   Lymphadenopathy:      Cervical: No cervical adenopathy.   Skin:     General: Skin is warm and dry.      Findings: No erythema or rash.   Neurological:      Mental Status: She is oriented to person, place, and time.       Previous liver function tests were reviewed   Ultrasound was reviewed   MRCP was reviewed   Operative notes were reviewed    Assessment & Plan:    Pain post cholecystectomy.    MRCP preoperatively that showed gallbladder sludge.      Liver function tests will be drawn today.      We will communicate with advanced endoscopy to see if a EUS and or an ERCP is warranted in the situation.      It is possible that a repeat MRCP may be required.      The patient ultimately may need evaluation by Gastroenterology.      I will message and or contact her with results.      Contact information has been provided      Additional information    Message with Interventional Radiology.    Labs show a slight increase in her bilirubin.      Will obtain an MRCP and discuss the results with the patient

## 2024-01-25 NOTE — LETTER
January 25, 2024        Hugh Middleton MD  [facility] [address]             [address]  [address] [facility] [address]  Phone: [phone]  Fax: [phone]   Patient: Jemma Pastrana   MR Number: [id number]   YOB: 1955   Date of Visit: 1/25/2024       Dear Dr. Middleton:    Thank you for referring Jemma Pastrana to me for evaluation. Below are the relevant portions of my assessment and plan of care.     We will further investigate her right upper quadrant pain similar to prior to cholecystectomy were repeat liver function tests and communicate with advanced endoscopy about the need for endoscopic ultrasound and or repeat MRCP       If you have questions, please do not hesitate to call me. I look forward to following Jemma along with you.    Sincerely,      Jose Carlson MD           CC    No Recipients

## 2024-01-25 NOTE — PATIENT INSTRUCTIONS
We need to check your liver function tests.      I will also message the advanced endoscopy doctors in [place] to see if they would consider an endoscopic ultrasound to look at your common bile duct given the MRI findings.      I will let you know what they say.      We may need to have you follow up with the gastroenterologist    If you develop severe abdominal pain please go to the emergency room    Our office phone numbers are  [phone] and

## 2024-01-26 ENCOUNTER — TELEPHONE (OUTPATIENT)
Dept: SURGERY | Facility: HOSPITAL | Age: 69
End: 2024-01-26
Payer: COMMERCIAL

## 2024-01-26 RX ORDER — DIAZEPAM 10 MG/1
10 TABLET ORAL ONCE
Qty: 1 TABLET | Refills: 0 | Status: SHIPPED | OUTPATIENT
Start: 2024-01-26 | End: 2024-01-26

## 2024-01-26 NOTE — TELEPHONE ENCOUNTER
Patient was called and we discussed her lab findings.  She states she slept 11 hours last night was very tired.  She feels her urine may be slightly darker.      Her bilirubin was slightly elevated at 1.5.      I discussed the findings through message Ng with advanced endoscopy.      MRCP was recommended.  MRCP ordered.  Valium sent to patient's pharmacy for oral administration prior to MRCP.      Will contact the patient with the MRCP results

## 2024-02-01 ENCOUNTER — HOSPITAL ENCOUNTER (OUTPATIENT)
Dept: RADIOLOGY | Facility: HOSPITAL | Age: 69
Discharge: HOME | End: 2024-02-01
Attending: SURGERY
Payer: COMMERCIAL

## 2024-02-01 DIAGNOSIS — Z90.49 STATUS POST CHOLECYSTECTOMY: ICD-10-CM

## 2024-02-01 PROCEDURE — 76376 3D RENDER W/INTRP POSTPROCES: CPT | Mod: 26,,, | Performed by: RADIOLOGY

## 2024-02-01 PROCEDURE — 76376 3D RENDER W/INTRP POSTPROCES: CPT | Mod: TC

## 2024-02-01 PROCEDURE — 74181 MRI ABDOMEN W/O CONTRAST: CPT | Mod: 26,,, | Performed by: RADIOLOGY

## 2024-02-01 PROCEDURE — 74181 MRI ABDOMEN W/O CONTRAST: CPT | Mod: TC

## 2024-02-02 ENCOUNTER — TELEPHONE (OUTPATIENT)
Dept: SURGERY | Facility: HOSPITAL | Age: 69
End: 2024-02-02
Payer: COMMERCIAL

## 2024-02-02 NOTE — TELEPHONE ENCOUNTER
Patient.  She would reviewed her MRCP results.  I discuss this with her.  I recommended a gastroenterology follow-up.  She is going to follow up with the gastroenterologist who she was seen in the past    She had our phone numbers and contact information.  We will be happy to see her back if she has any concerns    Juan Carlos Carter MD  [phone]  [phone] 2/2/2024 Routine     Narrative & Impression  EXAMINATION:  MRI ABDOMEN WITHOUT CONTRAST MRCP     CLINICAL HISTORY:  Acquired absence of other specified parts of digestive tractPain after gallbladder surgery, initial MRCP showed sludge.  Slightly elevation of bilirubin at 1.5.  Evaluate for common duct sludge/choledocholithiasis;     TECHNIQUE:  Multiplanar, multi-sequence noncontrast evaluation of the abdomen performed per the MRCP protocol, including axial and coronal T2 weighted images of the abdomen as well as thick slab fluid sensitive sequences with MIP images for evaluation of the biliary system.     COMPARISON:  11/01/2023     FINDINGS: MOTION LIMITED.  Liver is normal in size and attenuation.  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  The central and suprapancreatic     extrahepatic ducts have a maximum caliber of 5  mm which is within     normal limits. The intra pancreatic portion of the bile duct has a     caliber of  3 mm. No evidence of retained stones within the bile ducts.     No strictures are evident.  Post cholecystectomy.  No     intrahepatic bile duct dilation.     Pancreatic duct has maximum caliber of 2-3 mm which is within normal     limits. No ductal strictures are identified.     No pancreatic masses are identified. No fluid collections in the upper abdomen.  Multiple cysts are seen within the bilateral kidneys largest within the right kidney measures 4.3 cm.     Impression:     No evidence of ductal dilatation.  No choledocholithiasis noted.        Electronically signed by:  Juan Carlos Carter MD  Date:                                            02/02/2024  Time:                                           07:30

## (undated) DEVICE — SUT CTD VICRYL 0 UND BR SUT

## (undated) DEVICE — MANIFOLD 4 PORT

## (undated) DEVICE — DRAPE ABDOMINAL TIBURON 14X11

## (undated) DEVICE — APPLICATOR CHLORAPREP ORN 26ML

## (undated) DEVICE — DRAPE THREE-QTR REINF 53X77IN

## (undated) DEVICE — SEAL UNIVERSAL 5MM-8MM XI

## (undated) DEVICE — OBTURATOR BLADELESS 8MM XI CLR

## (undated) DEVICE — COVER TIP CURVED SCISSORS XI

## (undated) DEVICE — ELECTRODE REM PLYHSV RETURN 9

## (undated) DEVICE — SUT MONOCRYL 4.0 PS2 CP496G

## (undated) DEVICE — NDL PNEUMO INSUFFLATI 120MM

## (undated) DEVICE — DECANTER 6 VIAL

## (undated) DEVICE — KIT ANTIFOG W/SPONG & FLUID

## (undated) DEVICE — SYR 3CC LUER LOC

## (undated) DEVICE — PACK BASIC SETUP SC BR

## (undated) DEVICE — CLIP HEMO-LOK ML

## (undated) DEVICE — GLOVE SIGNATURE ESSNTL LTX 7

## (undated) DEVICE — DRAPE COLUMN DAVINCI XI

## (undated) DEVICE — BAG TISSUE RETRIEVAL 5MM

## (undated) DEVICE — GOWN POLY REINF BRTH SLV XL

## (undated) DEVICE — GLOVE SENSICARE PI GRN 7

## (undated) DEVICE — TOWEL OR DISP STRL BLUE 4/PK

## (undated) DEVICE — SUPPORT ULNA NERVE PROTECTOR

## (undated) DEVICE — ADHESIVE DERMABOND ADVANCED

## (undated) DEVICE — SOL NORMAL USPCA 0.9%

## (undated) DEVICE — DRAPE ARM DAVINCI XI

## (undated) DEVICE — SUT PDS II O CT-2 VIL MONO

## (undated) DEVICE — NDL ECLIPSE SAF REG 25GX1.5IN

## (undated) DEVICE — SET PNEUMOCLEAR HEAT HUM SE HF

## (undated) DEVICE — GLOVE SENSICARE PI ALOE 6.5

## (undated) DEVICE — COVER LIGHT HANDLE 80/CA

## (undated) DEVICE — SUT MCRYL PLUS 4-0 PS2 27IN

## (undated) DEVICE — HEADREST ROUND DISP FOAM 9IN

## (undated) DEVICE — SOL STRL WATER INJ 1000ML BG

## (undated) DEVICE — DEVICE CLOSURE DISP 14G